# Patient Record
Sex: FEMALE | Race: ASIAN | NOT HISPANIC OR LATINO | Employment: OTHER | URBAN - METROPOLITAN AREA
[De-identification: names, ages, dates, MRNs, and addresses within clinical notes are randomized per-mention and may not be internally consistent; named-entity substitution may affect disease eponyms.]

---

## 2017-05-16 ENCOUNTER — OFFICE VISIT (OUTPATIENT)
Dept: INTERNAL MEDICINE | Facility: CLINIC | Age: 62
End: 2017-05-16
Payer: MEDICAID

## 2017-05-16 VITALS
HEART RATE: 81 BPM | DIASTOLIC BLOOD PRESSURE: 80 MMHG | BODY MASS INDEX: 26.31 KG/M2 | TEMPERATURE: 97.5 F | HEIGHT: 64 IN | SYSTOLIC BLOOD PRESSURE: 130 MMHG | OXYGEN SATURATION: 100 % | WEIGHT: 154.1 LBS

## 2017-05-16 DIAGNOSIS — Z11.59 NEED FOR HEPATITIS B SCREENING TEST: ICD-10-CM

## 2017-05-16 DIAGNOSIS — Z11.59 NEED FOR HEPATITIS C SCREENING TEST: ICD-10-CM

## 2017-05-16 DIAGNOSIS — E05.90 HYPERTHYROIDISM: ICD-10-CM

## 2017-05-16 DIAGNOSIS — Z23 NEED FOR TDAP VACCINATION: ICD-10-CM

## 2017-05-16 DIAGNOSIS — Z00.00 ROUTINE GENERAL MEDICAL EXAMINATION AT A HEALTH CARE FACILITY: Primary | ICD-10-CM

## 2017-05-16 LAB
ALBUMIN SERPL-MCNC: 3.9 G/DL (ref 3.4–5)
ALP SERPL-CCNC: 107 U/L (ref 40–150)
ALT SERPL W P-5'-P-CCNC: 45 U/L (ref 0–50)
ANION GAP SERPL CALCULATED.3IONS-SCNC: 6 MMOL/L (ref 3–14)
AST SERPL W P-5'-P-CCNC: 34 U/L (ref 0–45)
BASOPHILS # BLD AUTO: 0 10E9/L (ref 0–0.2)
BASOPHILS NFR BLD AUTO: 0.2 %
BILIRUB SERPL-MCNC: 1.2 MG/DL (ref 0.2–1.3)
BUN SERPL-MCNC: 11 MG/DL (ref 7–30)
CALCIUM SERPL-MCNC: 9.1 MG/DL (ref 8.5–10.1)
CHLORIDE SERPL-SCNC: 106 MMOL/L (ref 94–109)
CHOLEST SERPL-MCNC: 203 MG/DL
CO2 SERPL-SCNC: 29 MMOL/L (ref 20–32)
CREAT SERPL-MCNC: 0.76 MG/DL (ref 0.52–1.04)
DIFFERENTIAL METHOD BLD: NORMAL
EOSINOPHIL # BLD AUTO: 0.1 10E9/L (ref 0–0.7)
EOSINOPHIL NFR BLD AUTO: 2.7 %
ERYTHROCYTE [DISTWIDTH] IN BLOOD BY AUTOMATED COUNT: 12.4 % (ref 10–15)
GFR SERPL CREATININE-BSD FRML MDRD: 78 ML/MIN/1.7M2
GLUCOSE SERPL-MCNC: 93 MG/DL (ref 70–99)
HCT VFR BLD AUTO: 43.4 % (ref 35–47)
HDLC SERPL-MCNC: 42 MG/DL
HGB BLD-MCNC: 14.1 G/DL (ref 11.7–15.7)
LDLC SERPL CALC-MCNC: 128 MG/DL
LYMPHOCYTES # BLD AUTO: 2.2 10E9/L (ref 0.8–5.3)
LYMPHOCYTES NFR BLD AUTO: 45.9 %
MCH RBC QN AUTO: 29.6 PG (ref 26.5–33)
MCHC RBC AUTO-ENTMCNC: 32.5 G/DL (ref 31.5–36.5)
MCV RBC AUTO: 91 FL (ref 78–100)
MONOCYTES # BLD AUTO: 0.3 10E9/L (ref 0–1.3)
MONOCYTES NFR BLD AUTO: 6.1 %
NEUTROPHILS # BLD AUTO: 2.1 10E9/L (ref 1.6–8.3)
NEUTROPHILS NFR BLD AUTO: 45.1 %
NONHDLC SERPL-MCNC: 161 MG/DL
PLATELET # BLD AUTO: 189 10E9/L (ref 150–450)
POTASSIUM SERPL-SCNC: 4.2 MMOL/L (ref 3.4–5.3)
PROT SERPL-MCNC: 8.6 G/DL (ref 6.8–8.8)
RBC # BLD AUTO: 4.76 10E12/L (ref 3.8–5.2)
SODIUM SERPL-SCNC: 141 MMOL/L (ref 133–144)
TRIGL SERPL-MCNC: 164 MG/DL
WBC # BLD AUTO: 4.8 10E9/L (ref 4–11)

## 2017-05-16 PROCEDURE — 80053 COMPREHEN METABOLIC PANEL: CPT | Performed by: FAMILY MEDICINE

## 2017-05-16 PROCEDURE — 80061 LIPID PANEL: CPT | Performed by: FAMILY MEDICINE

## 2017-05-16 PROCEDURE — 86803 HEPATITIS C AB TEST: CPT | Performed by: FAMILY MEDICINE

## 2017-05-16 PROCEDURE — 93000 ELECTROCARDIOGRAM COMPLETE: CPT | Performed by: FAMILY MEDICINE

## 2017-05-16 PROCEDURE — 85025 COMPLETE CBC W/AUTO DIFF WBC: CPT | Performed by: FAMILY MEDICINE

## 2017-05-16 PROCEDURE — 90715 TDAP VACCINE 7 YRS/> IM: CPT | Performed by: FAMILY MEDICINE

## 2017-05-16 PROCEDURE — 90471 IMMUNIZATION ADMIN: CPT | Performed by: FAMILY MEDICINE

## 2017-05-16 PROCEDURE — 87341 HEP B SURFACE AG NEUTRLZJ IA: CPT | Performed by: FAMILY MEDICINE

## 2017-05-16 PROCEDURE — 99396 PREV VISIT EST AGE 40-64: CPT | Mod: 25 | Performed by: FAMILY MEDICINE

## 2017-05-16 PROCEDURE — 87340 HEPATITIS B SURFACE AG IA: CPT | Performed by: FAMILY MEDICINE

## 2017-05-16 PROCEDURE — 36415 COLL VENOUS BLD VENIPUNCTURE: CPT | Performed by: FAMILY MEDICINE

## 2017-05-16 NOTE — MR AVS SNAPSHOT
After Visit Summary   5/16/2017    Mulu Knowles    MRN: [id number]           Patient Information     Date Of Birth          1955        Visit Information        Provider Department      5/16/2017 8:30 AM Open, Assignments; Wai Sotelo MD [facility]        Today's Diagnoses     Routine general medical examination at a health care facility    -  1    Need for hepatitis C screening test        Need for hepatitis B screening test        Hyperthyroidism          Care Instructions      Preventive Health Recommendations  Female Ages 50 - 64    Yearly exam: See your health care provider every year in order to  o Review health changes.   o Discuss preventive care.    o Review your medicines if your doctor has prescribed any.      Get a Pap test every three years (unless you have an abnormal result and your provider advises testing more often).    If you get Pap tests with HPV test, you only need to test every 5 years, unless you have an abnormal result.     You do not need a Pap test if your uterus was removed (hysterectomy) and you have not had cancer.    You should be tested each year for STDs (sexually transmitted diseases) if you're at risk.     Have a mammogram every 1 to 2 years.    Have a colonoscopy at age 50, or have a yearly FIT test (stool test). These exams screen for colon cancer.      Have a cholesterol test every 5 years, or more often if advised.    Have a diabetes test (fasting glucose) every three years. If you are at risk for diabetes, you should have this test more often.     If you are at risk for osteoporosis (brittle bone disease), think about having a bone density scan (DEXA).    Shots: Get a flu shot each year. Get a tetanus shot every 10 years.    Nutrition:     Eat at least 5 servings of fruits and vegetables each day.    Eat whole-grain bread, whole-wheat pasta and brown rice instead of white grains and rice.    Talk to your provider about Calcium and  "Vitamin D.     Lifestyle    Exercise at least 150 minutes a week (30 minutes a day, 5 days a week). This will help you control your weight and prevent disease.    Limit alcohol to one drink per day.    No smoking.     Wear sunscreen to prevent skin cancer.     See your dentist every six months for an exam and cleaning.    See your eye doctor every 1 to 2 years.          Follow-ups after your visit        Future tests that were ordered for you today     Open Future Orders        Priority Expected Expires Ordered    EKG 12-lead complete with read (future) Routine  5/16/2018 5/16/2017            Who to contact     If you have questions or need follow up information about today's clinic visit or your schedule please contact [facility] directly at [phone].  Normal or non-critical lab and imaging results will be communicated to you by Trademarkiahart, letter or phone within 4 business days after the clinic has received the results. If you do not hear from us within 7 days, please contact the clinic through Trademarkiahart or phone. If you have a critical or abnormal lab result, we will notify you by phone as soon as possible.  Submit refill requests through Nodality or call your pharmacy and they will forward the refill request to us. Please allow 3 business days for your refill to be completed.          Additional Information About Your Visit        Nodality Information     Nodality lets you send messages to your doctor, view your test results, renew your prescriptions, schedule appointments and more. To sign up, go to [email or web address] . Click on \"Log in\" on the left side of the screen, which will take you to the Welcome page. Then click on \"Sign up Now\" on the right side of the page.     You will be asked to enter the access code listed below, as well as some personal information. Please follow the directions to create your username and password.     Your access code is: [email or web address]  Expires: 8/14/2017  9:33 " "AM     Your access code will  in 90 days. If you need help or a new code, please call your [facility] or [phone].        Care EveryWhere ID     This is your Care EveryWhere ID. This could be used by other organizations to access your [facility] medical records  [id number]        Your Vitals Were     Pulse Temperature Height Pulse Oximetry BMI (Body Mass Index)       81 97.5  F (36.4  C) (Oral) 5' 3.75\" (1.619 m) 100% 26.66 kg/m2        Blood Pressure from Last 3 Encounters:   17 130/80   09/15/15 137/89   09/11/15 130/80    Weight from Last 3 Encounters:   17 154 lb 1.6 oz (69.9 kg)   09/15/15 149 lb (67.6 kg)   09/11/15 148 lb 4.8 oz (67.3 kg)              We Performed the Following     CBC with platelets and differential     Comprehensive metabolic panel (BMP + Alb, Alk Phos, ALT, AST, Total. Bili, TP)     Hepatitis B surface antigen     Hepatitis C antibody     Lipid Profile (Chol, Trig, HDL, LDL calc)        Primary Care Provider Office Phone # Fax #    Sera Esquivel [phone]       [facility] [address] [place] [address]        Thank you!     Thank you for choosing [facility]  for your care. Our goal is always to provide you with excellent care. Hearing back from our patients is one way we can continue to improve our services. Please take a few minutes to complete the written survey that you may receive in the mail after your visit with us. Thank you!             Your Updated Medication List - Protect others around you: Learn how to safely use, store and throw away your medicines at www.disposemymeds.org.          This list is accurate as of: 17  9:33 AM.  Always use your most recent med list.                   Brand Name Dispense Instructions for use    propylthiouracil 50 MG tablet    PTU     Take 50 mg by mouth daily Reported on 2017         "

## 2017-05-16 NOTE — PROGRESS NOTES
SUBJECTIVE:     CC: Mulu Knowles is an 61 year old woman who presents for preventive health visit.     Healthy Habits:    Do you get at least three servings of calcium containing foods daily (dairy, green leafy vegetables, etc.)? No    Amount of exercise or daily activities, outside of work: No.    Problems taking medications regularly No    Medication side effects: No    Have you had an eye exam in the past two years? no    Do you see a dentist twice per year? no    Do you have sleep apnea, excessive snoring or daytime drowsiness?no      She is here with  who does all of the talking. She apparently doesn't speak much English. Originally from [facility].  They desire lab work and EKG.  She declined pelvic exam and Pap and declined mammogram.      Patient Active Problem List   Diagnosis     CARDIOVASCULAR SCREENING; LDL GOAL LESS THAN 160     Hyperthyroidism     Current Outpatient Prescriptions   Medication Sig Dispense Refill     propylthiouracil (PTU) 50 MG tablet Take 50 mg by mouth daily Reported on 5/16/2017             Today's PHQ-2 Score:   PHQ-2 ( 1999 Pfizer) 9/11/2015 3/20/2014   Q1: Little interest or pleasure in doing things 0 0   Q2: Feeling down, depressed or hopeless 0 0   PHQ-2 Score 0 0       Abuse: Current or Past(Physical, Sexual or Emotional)- No  Do you feel safe in your environment - Yes    Social History   Substance Use Topics     Smoking status: Never Smoker     Smokeless tobacco: Never Used     Alcohol use No     The patient does not drink >3 drinks per day nor >7 drinks per week.    Recent Labs   Lab Test  05/14/12   1059   CHOL  187   HDL  41*   LDL  118   TRIG  138   CHOLHDLRATIO  4.6       Reviewed orders with patient.  Reviewed health maintenance and updated orders accordingly - No    Mammo Decision Support:  Mammo discussed, not appropriate for or declined by this patient.    Pertinent mammograms are reviewed under the imaging tab.  History of abnormal Pap smear: NO - age 30- 65  "PAP every 3 years recommended      Reviewed and updated as needed this visit by clinical staff         Reviewed and updated as needed this visit by Provider            ROS:  C: NEGATIVE for fever, chills, change in weight  I: NEGATIVE for worrisome rashes, moles or lesions  E: NEGATIVE for vision changes or irritation  ENT: NEGATIVE for ear, mouth and throat problems  R: NEGATIVE for significant cough or SOB  B: NEGATIVE for masses, tenderness or discharge  CV: NEGATIVE for chest pain, palpitations or peripheral edema  GI: NEGATIVE for nausea, abdominal pain, heartburn, or change in bowel habits  : NEGATIVE for unusual urinary or vaginal symptoms. No vaginal bleeding.  M: NEGATIVE for significant arthralgias or myalgia  N: NEGATIVE for weakness, dizziness or paresthesias  P: NEGATIVE for changes in mood or affect       OBJECTIVE:     /80 (BP Location: Right arm, Patient Position: Chair, Cuff Size: Adult Large)  Pulse 81  Temp 97.5  F (36.4  C) (Oral)  Ht 5' 3.75\" (1.619 m)  Wt 154 lb 1.6 oz (69.9 kg)  SpO2 100%  BMI 26.66 kg/m2  EXAM:  GENERAL: healthy, alert and no distress  EYES: Eyes grossly normal to inspection, PERRL and conjunctivae and sclerae normal  HENT: ear canals and TM's normal, nose and mouth without ulcers or lesions  NECK: no adenopathy, no asymmetry, masses, or scars and thyroid normal to palpation  RESP: lungs clear to auscultation - no rales, rhonchi or wheezes  BREAST: normal without masses, tenderness or nipple discharge and no palpable axillary masses or adenopathy  CV: regular rate and rhythm, normal S1 S2, no S3 or S4, no murmur, click or rub, no peripheral edema   ABDOMEN: soft, nontender, no hepatosplenomegaly, no masses and bowel sounds normal  MS: no gross musculoskeletal defects noted, no edema  SKIN: no suspicious lesions or rashes  NEURO: Normal strength and tone, mentation intact and speech normal  PSYCH: mentation appears normal, affect normal/bright      EKG: RSR, rate " "70, axis normal, St segments and T waves normal, no ectopy, no scar. Tracing WNL.        ASSESSMENT/PLAN:     1. Routine general medical examination at a health care facility  Exam WNL as performed.  - Comprehensive metabolic panel (BMP + Alb, Alk Phos, ALT, AST, Total. Bili, TP)  - Lipid Profile (Chol, Trig, HDL, LDL calc)  - CBC with platelets and differential  - EKG 12-lead complete with read (future); Future  - EKG 12-lead complete w/read - Clinics    2. Need for hepatitis C screening test    - Hepatitis C antibody    3. Need for hepatitis B screening test    - Hepatitis B surface antigen    4. Hyperthyroidism  She sees an Endocrinologist. Declines thyroid functin labs today.    5. Need for Tdap vaccination    - TDAP (ADACEL)    COUNSELING:   Reviewed preventive health counseling, as reflected in patient instructions       Regular exercise       Healthy diet/nutrition         reports that she has never smoked. She has never used smokeless tobacco.    Estimated body mass index is 25.78 kg/(m^2) as calculated from the following:    Height as of 9/15/15: 5' 3.75\" (1.619 m).    Weight as of 9/15/15: 149 lb (67.6 kg).       Counseling Resources:  ATP IV Guidelines  Pooled Cohorts Equation Calculator  Breast Cancer Risk Calculator  FRAX Risk Assessment  ICSI Preventive Guidelines  Dietary Guidelines for Americans, 2010  USDA's MyPlate  ASA Prophylaxis  Lung CA Screening    Wai Sotelo MD  [facility]  "

## 2017-05-16 NOTE — NURSING NOTE
"Chief Complaint   Patient presents with     Physical     She thinks she's allergic to some kind of food because after eating, her lips feels tingling for a long time. She's fasting for blood work.       Initial /80 (BP Location: Right arm, Patient Position: Chair, Cuff Size: Adult Large)  Pulse 81  Temp 97.5  F (36.4  C) (Oral)  Ht 5' 3.75\" (1.619 m)  Wt 154 lb 1.6 oz (69.9 kg)  SpO2 100%  BMI 26.66 kg/m2 Estimated body mass index is 26.66 kg/(m^2) as calculated from the following:    Height as of this encounter: 5' 3.75\" (1.619 m).    Weight as of this encounter: 154 lb 1.6 oz (69.9 kg).  Medication Reconciliation: complete   Claudine Batista MA      "

## 2017-05-17 LAB
HBV SURFACE AG SERPL QL IA: REACTIVE
HCV AB SERPL QL IA: NORMAL

## 2017-06-24 ENCOUNTER — HEALTH MAINTENANCE LETTER (OUTPATIENT)
Age: 62
End: 2017-06-24

## 2018-04-09 ENCOUNTER — OFFICE VISIT (OUTPATIENT)
Dept: INTERNAL MEDICINE | Facility: CLINIC | Age: 63
End: 2018-04-09
Payer: COMMERCIAL

## 2018-04-09 VITALS
HEIGHT: 63 IN | RESPIRATION RATE: 20 BRPM | SYSTOLIC BLOOD PRESSURE: 158 MMHG | BODY MASS INDEX: 28.42 KG/M2 | TEMPERATURE: 97.8 F | HEART RATE: 92 BPM | WEIGHT: 160.4 LBS | DIASTOLIC BLOOD PRESSURE: 104 MMHG

## 2018-04-09 DIAGNOSIS — Z91.013 SEAFOOD ALLERGY: ICD-10-CM

## 2018-04-09 DIAGNOSIS — I10 BENIGN ESSENTIAL HYPERTENSION: Primary | ICD-10-CM

## 2018-04-09 DIAGNOSIS — E05.90 HYPERTHYROIDISM: ICD-10-CM

## 2018-04-09 DIAGNOSIS — K21.9 GASTROESOPHAGEAL REFLUX DISEASE WITHOUT ESOPHAGITIS: ICD-10-CM

## 2018-04-09 DIAGNOSIS — L50.9 URTICARIA: ICD-10-CM

## 2018-04-09 DIAGNOSIS — T78.3XXD ANGIOEDEMA, SUBSEQUENT ENCOUNTER: ICD-10-CM

## 2018-04-09 LAB
ANION GAP SERPL CALCULATED.3IONS-SCNC: 4 MMOL/L (ref 3–14)
BUN SERPL-MCNC: 12 MG/DL (ref 7–30)
CALCIUM SERPL-MCNC: 8.9 MG/DL (ref 8.5–10.1)
CHLORIDE SERPL-SCNC: 104 MMOL/L (ref 94–109)
CO2 SERPL-SCNC: 30 MMOL/L (ref 20–32)
CREAT SERPL-MCNC: 0.72 MG/DL (ref 0.52–1.04)
GFR SERPL CREATININE-BSD FRML MDRD: 82 ML/MIN/1.7M2
GLUCOSE SERPL-MCNC: 86 MG/DL (ref 70–99)
POTASSIUM SERPL-SCNC: 4.4 MMOL/L (ref 3.4–5.3)
SODIUM SERPL-SCNC: 138 MMOL/L (ref 133–144)
TSH SERPL DL<=0.005 MIU/L-ACNC: 1.77 MU/L (ref 0.4–4)

## 2018-04-09 PROCEDURE — 99214 OFFICE O/P EST MOD 30 MIN: CPT | Performed by: INTERNAL MEDICINE

## 2018-04-09 PROCEDURE — 36415 COLL VENOUS BLD VENIPUNCTURE: CPT | Performed by: INTERNAL MEDICINE

## 2018-04-09 PROCEDURE — T1013 SIGN LANG/ORAL INTERPRETER: HCPCS | Mod: U3 | Performed by: INTERNAL MEDICINE

## 2018-04-09 PROCEDURE — 84443 ASSAY THYROID STIM HORMONE: CPT | Performed by: INTERNAL MEDICINE

## 2018-04-09 PROCEDURE — 80048 BASIC METABOLIC PNL TOTAL CA: CPT | Performed by: INTERNAL MEDICINE

## 2018-04-09 RX ORDER — ATENOLOL 25 MG/1
25 TABLET ORAL DAILY
Qty: 90 TABLET | Refills: 1 | Status: SHIPPED | OUTPATIENT
Start: 2018-04-09 | End: 2018-07-31 | Stop reason: DRUGHIGH

## 2018-04-09 NOTE — PROGRESS NOTES
SUBJECTIVE:   Mulu nKowles is a 62 year old female who presents to clinic today for the following health issues:    Pt states uses atenolol PRN    Mouth/Lip Problem      Duration: 5-6 months    Description (location/character/radiation): swollen and itching on lips    Intensity:  mild    Accompanying signs and symptoms: redness on lips, drys and cracks    History (similar episodes/previous evaluation): None    Precipitating or alleviating factors: worse when she does not put anything on it. Stings when eating salty foods.     Therapies tried and outcome: cold sore ointment and carmex with some relief with dryness. When she does not use ointment it peels           Problem list and histories reviewed & adjusted, as indicated.  Additional history: as documented        Reviewed and updated as needed this visit by clinical staff  Tobacco  Allergies  Meds       Reviewed and updated as needed this visit by Provider  Allergies           Past Medical History:  ---------------------------  Past Medical History:   Diagnosis Date     Allergy      Hyperthyroidism 6/2/12       Past Surgical History:  ---------------------------  Past Surgical History:   Procedure Laterality Date     DISCECTOMY LUMBAR POSTERIOR MICROSCOPIC ONE LEVEL  6/4/07    Left L4-L5 [facility]diske[facility] at [facility]       Current Medications:  ---------------------------  Current Outpatient Prescriptions   Medication Sig Dispense Refill     IBUPROFEN PO        ATENOLOL PO Take 25 mg by mouth daily as needed (uses every 10 days or so)       B Complex Vitamins (B COMPLEX PO)          Allergies:  -------------  Allergies   Allergen Reactions     Bactrim [Sulfamethoxazole W-Trimethoprim] Hives     hives     Gaviscon [Foaming Antacid]      hives     Seafood      Advil [Ibuprofen] Itching and Rash     Ampicillin Itching and Rash     Penicillins Itching and Rash     Tylenol [Acetaminophen] Itching and Rash       Social History:  -------------------  Social History  "    Social History     Marital status:      Spouse name: N/A     Number of children: N/A     Years of education: N/A     Occupational History     Not on file.     Social History Main Topics     Smoking status: Never Smoker     Smokeless tobacco: Never Used     Alcohol use No     Drug use: No     Sexual activity: Yes     Partners: Male     Other Topics Concern     Not on file     Social History Narrative       Family Medical History:  ------------------------------  Family History   Problem Relation Age of Onset     Unknown/Adopted Mother      Unknown/Adopted Father      Unknown/Adopted Maternal Grandmother      Unknown/Adopted Maternal Grandfather      Unknown/Adopted Paternal Grandmother      Unknown/Adopted Paternal Grandfather          ROS:  REVIEW OF SYSTEMS:    RESP: negative for cough, dyspnea, wheezing, hemoptysis  CV: negative for chest pain, palpitations, PND, BROOKS, orthopnea; reports no changes in their ability to perform physical activity (from cardiovascular standpoint)  GI: negative for dysphagia, N/V, pain, melena, diarrhea and constipation  NEURO: negative for numbness/tingling, paralysis, incoordination, or focal weakness     OBJECTIVE:                                                    BP (!) 158/104  Pulse 92  Temp 97.8  F (36.6  C) (Oral)  Resp 20  Ht 5' 2.75\" (1.594 m)  Wt 160 lb 6.4 oz (72.8 kg)  BMI 28.64 kg/m2     GENERAL alert and no distress  EYES:  Normal sclera,conjunctiva, EOMI  HENT: oral and posterior pharynx without lesions or erythema, facies symmetric  NECK: Neck supple. No LAD, without thyroidmegaly or JVD., Carotids without bruits.  RESP: Clear to ausculation bilaterally without wheezes or crackles. Normal BS in all fields.  CV: RRR normal S1S2 without murmurs, rubs or gallops. PMI normal  LYMPH: no cervical lymph adenopathy appreciated  MS: extremities- no gross deformities of the visible extremities noted, no edema  PSYCH: Alert and oriented times 3; speech- " coherent  SKIN:  No obvious significant skin lesions on visible portions of face          ASSESSMENT/PLAN:                                                      (I10) Benign essential hypertension  (primary encounter diagnosis)  Comment: resume meds., be sure to take them every day  Discussed current hypertension treatment guidelines, including indications for treatment and treatment options.  Discussed the importance for aggressive management of HTN to prevent vascular complications later.  Recommended lower fat, lower carbohydrate, and lower sodium (<2000 mg)diet.  Discussed required intervals for follow up on HTN, lab studies.  Recommened pt. follow their blood pressures outside the clinic to ensure that BPs are remaining within guidelines, and to contact me if the readings are not within guidelines on a regular basis so we can adjust treatment as needed.   Plan: atenolol (TENORMIN) 25 MG tablet, Basic         metabolic panel            (E05.90) Hyperthyroidism  Comment: recheck labs.   Plan: TSH with free T4 reflex            (Z91.013) Seafood allergy  Comment: allergy referral for further testing to further deliniate the extent of her allergies.   Plan: ALLERGY/ASTHMA ADULT REFERRAL            (T78.3XXD) Angioedema, subsequent encounter  Comment:   Plan: ALLERGY/ASTHMA ADULT REFERRAL            (L50.9) Urticaria  Comment:   Plan: ALLERGY/ASTHMA ADULT REFERRAL, ranitidine         (ZANTAC) 150 MG tablet            (K21.9) Gastroesophageal reflux disease without esophagitis  Comment: This condition is currently controlled on the current medical regimen.  Continue current therapy.   Plan: ranitidine (ZANTAC) 150 MG tablet               *  Checking the thyroid labs to make sure that your thyroid levels are still normal.     *  If any abnormalities, we will then send you to the Endocrinologists.     *  Your blood pressure is higher than desired.      --Resume Atenolol 25 mg once per day EVERY DAY.     *  Take the  "blood pressure medication ( Atenolol)  EVERY DAY.     *  Urticaria or \"hives\" is a systemic allergic reaction that needs to be \"treated from the inside\".  Avoid any known triggers (if you can find out what they might have been.  Sometime you never find out what triggered the reaction.      *  Take over the counter allergy tablet every day for the next 4-6 weeks.      Take one of the following over the counter medications:  --generic Allegra (fexofenadine),   --generic Zyrtec (Cetirizine),   --generic Claritin (Loratadine)     *  Use over the counter Benadryl 25 or 50 mg twice per day as needed for itching relief,perhaps more in the evening or bedtime.  Beware of drowsiness after taking this medication, do not drive, use dangerous machinery, or perform dangerous tasks after taking this.      *  Referral to allergist to better understand the extent of your allergies:     --[facility] [place] [phone]   [email or web address]     --[facility] [phone]   [email or web address]     --[facility]: [place] [facility] [phone]   [email or web address]     --[facility] [place] [phone]   [email or web address]    *  Take Ranitidine (generic Zantac) 150 mg twice per day to help reduce heartburn, and also help with the allergy symptoms.     *  Return to see Dr. Esquivel at the [facility] or myself in 2-3 months.  You can also follow up at the [facility] in [place] if that is closer to your home.               See Patient Instructions    ALLI LANDAVERDE M.D., MD  [facility]   "

## 2018-04-09 NOTE — LETTER
[facility]  [address]  [address]  [phone]        Mulu Knowles  [address]  [facility] [address]      4/10/2018        Dear Ms. Mulu Knowles:    I am writing to inform you of the results of the laboratory tests you had done recently.    Kidney  function: NORMAL  Thyroid function: NORMAL  Electrolytes: NORMAL  Glucose: NORMAL    Your labs are all within normal limits., including the thyroid labs.     Thank you for allowing me to participate in your care. If you have any further questions or problems, please contact me via our nurse line at [phone].    Sincerely,          Bay Cyr MD  Department of Internal Medicine  [facility]

## 2018-04-09 NOTE — PATIENT INSTRUCTIONS
"*  Checking the thyroid labs to make sure that your thyroid levels are still normal.     *  If any abnormalities, we will then send you to the Endocrinologists.     *  Your blood pressure is higher than desired.      --Resume Atenolol 25 mg once per day EVERY DAY.     *  Take the blood pressure medication ( Atenolol)  EVERY DAY.     *  Urticaria or \"hives\" is a systemic allergic reaction that needs to be \"treated from the inside\".  Avoid any known triggers (if you can find out what they might have been.  Sometime you never find out what triggered the reaction.      *  Take over the counter allergy tablet every day for the next 4-6 weeks.      Take one of the following over the counter medications:  --generic Allegra (fexofenadine),   --generic Zyrtec (Cetirizine),   --generic Claritin (Loratadine)     *  Use over the counter Benadryl 25 or 50 mg twice per day as needed for itching relief,perhaps more in the evening or bedtime.  Beware of drowsiness after taking this medication, do not drive, use dangerous machinery, or perform dangerous tasks after taking this.      *  Referral to allergist to better understand the extent of your allergies:     --[facility] [place] [phone]   [email or web address]     --[facility] [phone]   [email or web address]     --[facility]: [place] [facility] [phone]   [email or web address]     --[facility] [place] [phone]   [email or web address]    *  Take Ranitidine (generic Zantac) 150 mg twice per day to help reduce heartburn, and also help with the allergy symptoms.     *  Return to see Dr. Esquivel at the [facility] or myself in 2-3 months.  You can also follow up at the [facility] in [place] if that is closer to your home.           "

## 2018-04-09 NOTE — MR AVS SNAPSHOT
"              After Visit Summary   4/9/2018    Mulu Knowles    MRN: [id number]           Patient Information     Date Of Birth          1955        Visit Information        Provider Department      4/9/2018 3:30 PM Aura Disla Christofer Adams, MD [facility]        Today's Diagnoses     Benign essential hypertension    -  1    Hyperthyroidism        Seafood allergy        Angioedema, subsequent encounter        Urticaria        Gastroesophageal reflux disease without esophagitis          Care Instructions    *  Checking the thyroid labs to make sure that your thyroid levels are still normal.     *  If any abnormalities, we will then send you to the Endocrinologists.     *  Your blood pressure is higher than desired.      --Resume Atenolol 25 mg once per day EVERY DAY.     *  Take the blood pressure medication ( Atenolol)  EVERY DAY.     *  Urticaria or \"hives\" is a systemic allergic reaction that needs to be \"treated from the inside\".  Avoid any known triggers (if you can find out what they might have been.  Sometime you never find out what triggered the reaction.      *  Take over the counter allergy tablet every day for the next 4-6 weeks.      Take one of the following over the counter medications:  --generic Allegra (fexofenadine),   --generic Zyrtec (Cetirizine),   --generic Claritin (Loratadine)     *  Use over the counter Benadryl 25 or 50 mg twice per day as needed for itching relief,perhaps more in the evening or bedtime.  Beware of drowsiness after taking this medication, do not drive, use dangerous machinery, or perform dangerous tasks after taking this.      *  Referral to allergist to better understand the extent of your allergies:     --[facility] [place] [phone]   [email or web address]     --[facility] [place] [phone]   [email or web address]     --[facility]: [place] " [facility] [phone]   [email or web address]     --[facility] [phone]   [email or web address]    *  Take Ranitidine (generic Zantac) 150 mg twice per day to help reduce heartburn, and also help with the allergy symptoms.     *  Return to see Dr. Esquivel at the [facility] or myself in 2-3 months.  You can also follow up at the [facility] in [place] if that is closer to your home.                   Follow-ups after your visit        Additional Services     ALLERGY/ASTHMA ADULT REFERRAL       Your provider has referred you to:     [facility] [place] [phone]   [email or web address]    [facility] [facility] [phone]   [email or web address]    [facility] [place] [facility] [phone]   [email or web address]    [facility] [facility] [phone]   [email or web address]    Please be aware that coverage of these services is subject to the terms and limitations of your health insurance plan.  Call member services at your health plan with any benefit or coverage questions.      Please bring the following with you to your appointment:    (1) Any X-Rays, CTs or MRIs which have been performed.  Contact the facility where they were done to arrange for  prior to your scheduled appointment.    (2) List of current medications  (3) This referral request   (4) Any documents/labs given to you for this referral                  Who to contact     If you have questions or need follow up information about today's clinic visit or your schedule please contact [facility] directly at [phone].  Normal or non-critical lab and imaging results will be communicated to you by MyChart, letter or phone within 4 business days after the clinic has received the results. If  "you do not hear from us within 7 days, please contact the clinic through Yvolver or phone. If you have a critical or abnormal lab result, we will notify you by phone as soon as possible.  Submit refill requests through Yvolver or call your pharmacy and they will forward the refill request to us. Please allow 3 business days for your refill to be completed.          Additional Information About Your Visit        AvitideharLowfoot Information     Yvolver lets you send messages to your doctor, view your test results, renew your prescriptions, schedule appointments and more. To sign up, go to [email or web address] . Click on \"Log in\" on the left side of the screen, which will take you to the Welcome page. Then click on \"Sign up Now\" on the right side of the page.     You will be asked to enter the access code listed below, as well as some personal information. Please follow the directions to create your username and password.     Your access code is: [email or web address]  Expires: 2018  4:55 PM     Your access code will  in 90 days. If you need help or a new code, please call your [place] clinic or [phone].        Care EveryWhere ID     This is your Care EveryWhere ID. This could be used by other organizations to access your [place] medical records  [id number]        Your Vitals Were     Pulse Temperature Respirations Height BMI (Body Mass Index)       92 97.8  F (36.6  C) (Oral) 20 5' 2.75\" (1.594 m) 28.64 kg/m2        Blood Pressure from Last 3 Encounters:   18 (!) 158/104   17 130/80   09/15/15 137/89    Weight from Last 3 Encounters:   18 160 lb 6.4 oz (72.8 kg)   17 154 lb 1.6 oz (69.9 kg)   09/15/15 149 lb (67.6 kg)              We Performed the Following     ALLERGY/ASTHMA ADULT REFERRAL     Basic metabolic panel     TSH with free T4 reflex          Today's Medication Changes          These changes are accurate as of 18  4:55 PM.  If you have any questions, ask your nurse or doctor. "               Start taking these medicines.        Dose/Directions    ranitidine 150 MG tablet   Commonly known as:  ZANTAC   Used for:  Gastroesophageal reflux disease without esophagitis, Urticaria   Started by:  Bay Cyr MD        Dose:  150 mg   Take 1 tablet (150 mg) by mouth 2 times daily   Quantity:  60 tablet   Refills:  11         These medicines have changed or have updated prescriptions.        Dose/Directions    atenolol 25 MG tablet   Commonly known as:  TENORMIN   This may have changed:    - when to take this  - reasons to take this   Used for:  Benign essential hypertension   Changed by:  Bay Cyr MD        Dose:  25 mg   Take 1 tablet (25 mg) by mouth daily   Quantity:  90 tablet   Refills:  1         Stop taking these medicines if you haven't already. Please contact your care team if you have questions.     IBUPROFEN PO   Stopped by:  Bay Cyr MD                Where to get your medicines      These medications were sent to [address] [address]     Phone:  [phone]     atenolol 25 MG tablet    ranitidine 150 MG tablet                Primary Care Provider Office Phone # Fax #    Sera Esquivel [phone]       [address] [address]        Equal Access to Services     TRISTAN GLOVER AH: Hadii gil ku hadasho Soomaali, waaxda luqadaha, qaybta kaalmada adeegyada, waxay marioin hayzachn nabila santos. So [facility] [phone].    ATENCIÓN: Si habla español, tiene a dorsey disposición servicios gratuitos de asistencia lingüística. Llame al [phone].    We comply with applicable federal civil rights laws and [place] laws. We do not discriminate on the basis of race, color, national origin, age, disability, sex, sexual orientation, or gender identity.            Thank you!     Thank you for choosing [facility]   for your care. Our goal is always to provide you with excellent care. Hearing back from our patients is one way we can continue to improve our services. Please take a few minutes to complete the written survey that you may receive in the mail after your visit with us. Thank you!             Your Updated Medication List - Protect others around you: Learn how to safely use, store and throw away your medicines at www.disposemymeds.org.          This list is accurate as of 4/9/18  4:55 PM.  Always use your most recent med list.                   Brand Name Dispense Instructions for use Diagnosis    atenolol 25 MG tablet    TENORMIN    90 tablet    Take 1 tablet (25 mg) by mouth daily    Benign essential hypertension       B COMPLEX PO           ranitidine 150 MG tablet    ZANTAC    60 tablet    Take 1 tablet (150 mg) by mouth 2 times daily    Gastroesophageal reflux disease without esophagitis, Urticaria

## 2018-04-25 ENCOUNTER — TRANSFERRED RECORDS (OUTPATIENT)
Dept: HEALTH INFORMATION MANAGEMENT | Facility: CLINIC | Age: 63
End: 2018-04-25

## 2018-05-22 ENCOUNTER — OFFICE VISIT (OUTPATIENT)
Dept: INTERNAL MEDICINE | Facility: CLINIC | Age: 63
End: 2018-05-22
Payer: COMMERCIAL

## 2018-05-22 VITALS
OXYGEN SATURATION: 98 % | HEART RATE: 91 BPM | BODY MASS INDEX: 27.69 KG/M2 | SYSTOLIC BLOOD PRESSURE: 110 MMHG | RESPIRATION RATE: 13 BRPM | DIASTOLIC BLOOD PRESSURE: 66 MMHG | WEIGHT: 155.1 LBS | TEMPERATURE: 97.7 F

## 2018-05-22 DIAGNOSIS — R30.0 DYSURIA: ICD-10-CM

## 2018-05-22 DIAGNOSIS — N30.00 ACUTE CYSTITIS WITHOUT HEMATURIA: Primary | ICD-10-CM

## 2018-05-22 LAB
ALBUMIN UR-MCNC: NEGATIVE MG/DL
APPEARANCE UR: CLEAR
BILIRUB UR QL STRIP: NEGATIVE
COLOR UR AUTO: YELLOW
GLUCOSE UR STRIP-MCNC: NEGATIVE MG/DL
HGB UR QL STRIP: ABNORMAL
KETONES UR STRIP-MCNC: NEGATIVE MG/DL
LEUKOCYTE ESTERASE UR QL STRIP: NEGATIVE
NITRATE UR QL: NEGATIVE
PH UR STRIP: 5.5 PH (ref 5–7)
RBC #/AREA URNS AUTO: ABNORMAL /HPF
SOURCE: ABNORMAL
SP GR UR STRIP: 1.01 (ref 1–1.03)
UROBILINOGEN UR STRIP-ACNC: 0.2 EU/DL (ref 0.2–1)
WBC #/AREA URNS AUTO: ABNORMAL /HPF

## 2018-05-22 PROCEDURE — 99213 OFFICE O/P EST LOW 20 MIN: CPT | Performed by: INTERNAL MEDICINE

## 2018-05-22 PROCEDURE — 81001 URINALYSIS AUTO W/SCOPE: CPT | Performed by: INTERNAL MEDICINE

## 2018-05-22 PROCEDURE — T1013 SIGN LANG/ORAL INTERPRETER: HCPCS | Mod: U3 | Performed by: INTERNAL MEDICINE

## 2018-05-22 RX ORDER — NITROFURANTOIN 25; 75 MG/1; MG/1
100 CAPSULE ORAL 2 TIMES DAILY
Qty: 6 CAPSULE | Refills: 0 | Status: SHIPPED | OUTPATIENT
Start: 2018-05-22 | End: 2019-02-01

## 2018-05-22 NOTE — PROGRESS NOTES
"  SUBJECTIVE:   Mulu Knowles is a 62 year old female who presents to clinic today for the following health issues:    URINARY TRACT SYMPTOMS      Duration: 2 days    Description  dysuria, frequency and hematuria    Intensity:  severe    Accompanying signs and symptoms:  Fever/chills: YES  Flank pain YES  Nausea and vomiting: no   Vaginal symptoms: none  Abdominal/Pelvic Pain: no     History  History of frequent UTI's: YES  History of kidney stones: no   Sexually Active: no   Possibility of pregnancy: No    Precipitating or alleviating factors: None    Therapies tried and outcome: pt takes med from [facility] called\" Furadantine 50mg\" on onset of sx and had relief within one hour    Already started taking  over the counter Nitrofurantoin from [facility], 50 mg tablet, 6 tablets so far since yesterday.           Problem list and histories reviewed & adjusted, as indicated.  Additional history: as documented        Reviewed and updated as needed this visit by clinical staff  Tobacco  Allergies  Meds       Reviewed and updated as needed this visit by Provider           Past Medical History:  ---------------------------  Past Medical History:   Diagnosis Date     Allergy      Hyperthyroidism 6/2/12       Past Surgical History:  ---------------------------  Past Surgical History:   Procedure Laterality Date     DISCECTOMY LUMBAR POSTERIOR MICROSCOPIC ONE LEVEL  6/4/07    Left L4-L5 microdiskectomy at [facility]       Current Medications:  ---------------------------  Current Outpatient Prescriptions   Medication Sig Dispense Refill     atenolol (TENORMIN) 25 MG tablet Take 1 tablet (25 mg) by mouth daily 90 tablet 1     B Complex Vitamins (B COMPLEX PO)        ranitidine (ZANTAC) 150 MG tablet Take 1 tablet (150 mg) by mouth 2 times daily 60 tablet 11       Allergies:  -------------  Allergies   Allergen Reactions     Bactrim [Sulfamethoxazole W-Trimethoprim] Hives     hives     Gaviscon [Foaming Antacid]      hives     Rice  "     Seafood      Advil [Ibuprofen] Itching and Rash     Ampicillin Itching and Rash     Penicillins Itching and Rash     Tylenol [Acetaminophen] Itching and Rash       Social History:  -------------------  Social History     Social History     Marital status:      Spouse name: N/A     Number of children: N/A     Years of education: N/A     Occupational History     Not on file.     Social History Main Topics     Smoking status: Never Smoker     Smokeless tobacco: Never Used     Alcohol use No     Drug use: No     Sexual activity: Yes     Partners: Male     Other Topics Concern     Not on file     Social History Narrative       Family Medical History:  ------------------------------  Family History   Problem Relation Age of Onset     Unknown/Adopted Mother      Unknown/Adopted Father      Unknown/Adopted Maternal Grandmother      Unknown/Adopted Maternal Grandfather      Unknown/Adopted Paternal Grandmother      Unknown/Adopted Paternal Grandfather          ROS:  REVIEW OF SYSTEMS:    RESP: negative for cough, dyspnea, wheezing, hemoptysis  CV: negative for chest pain, palpitations, PND, BROOKS, orthopnea; reports no changes in their ability to perform physical activity (from cardiovascular standpoint)  GI: negative for dysphagia, N/V, pain, melena, diarrhea and constipation  NEURO: negative for numbness/tingling, paralysis, incoordination, or focal weakness     OBJECTIVE:                                                    /66  Pulse 91  Temp 97.7  F (36.5  C) (Oral)  Resp 13  Wt 155 lb 1.6 oz (70.4 kg)  SpO2 98%  BMI 27.69 kg/m2     Physical exam deferred today.      Results for orders placed or performed in visit on 05/22/18   UA with Microscopic reflex to Culture   Result Value Ref Range    Color Urine Yellow     Appearance Urine Clear     Glucose Urine Negative NEG^Negative mg/dL    Bilirubin Urine Negative NEG^Negative    Ketones Urine Negative NEG^Negative mg/dL    Specific Gravity Urine 1.015  1.003 - 1.035    pH Urine 5.5 5.0 - 7.0 pH    Protein Albumin Urine Negative NEG^Negative mg/dL    Urobilinogen Urine 0.2 0.2 - 1.0 EU/dL    Nitrite Urine Negative NEG^Negative    Blood Urine Trace (A) NEG^Negative    Leukocyte Esterase Urine Negative NEG^Negative    Source Midstream Urine     WBC Urine 0 - 5 OTO5^0 - 5 /HPF    RBC Urine O - 2 OTO2^O - 2 /HPF         ASSESSMENT/PLAN:                                                      (N30.00) Acute cystitis without hematuria  (primary encounter diagnosis)  Comment: probably had UTI, but already has completed one day of nitrofurantoin antibiotics.   Finish course of antibiotics.   Drink enough fluid.      Plan:     (R30.0) Dysuria  Comment:   Plan: UA with Microscopic reflex to Culture,         nitroFURantoin, macrocrystal-monohydrate,         (MACROBID) 100 MG capsule               See Patient Instructions    ALIL LANDAVERDE M.D., MD  [facility]

## 2018-05-22 NOTE — MR AVS SNAPSHOT
"              After Visit Summary   5/22/2018    Mulu Knowles    MRN: [id number]           Patient Information     Date Of Birth          1955        Visit Information        Provider Department      5/22/2018 3:00 PM Aura Disla Christofer Adams, MD [facility]        Today's Diagnoses     Acute cystitis without hematuria    -  1    Dysuria          Care Instructions    *  You probably had a urinary tract infection and started on an appropriate antibiotic that started working.     *  Finish course of oral antibiotic:  Nitrofuratnoin 100 mg twice per day for 3 days.    *  Be sure to drink enough of water throughout the day.      *  Continue all medications at the same doses.  Contact your usual pharmacy if you need refills.               Follow-ups after your visit        Who to contact     If you have questions or need follow up information about today's clinic visit or your schedule please contact [facility] directly at [phone].  Normal or non-critical lab and imaging results will be communicated to you by MyChart, letter or phone within 4 business days after the clinic has received the results. If you do not hear from us within 7 days, please contact the clinic through [email or web address] or phone. If you have a critical or abnormal lab result, we will notify you by phone as soon as possible.  Submit refill requests through Haitaobei or call your pharmacy and they will forward the refill request to us. Please allow 3 business days for your refill to be completed.          Additional Information About Your Visit        [email or web address] Information     Haitaobei lets you send messages to your doctor, view your test results, renew your prescriptions, schedule appointments and more. To sign up, go to [email or web address] . Click on \"Log in\" on the left side of the screen, which will take you to the Welcome page. Then click on \"Sign up Now\" on the right side of the page. "     You will be asked to enter the access code listed below, as well as some personal information. Please follow the directions to create your username and password.     Your access code is: [email or web address]  Expires: 2018  4:55 PM     Your access code will  in 90 days. If you need help or a new code, please call your [place] clinic or [phone].        Care EveryWhere ID     This is your Care EveryWhere ID. This could be used by other organizations to access your [place] medical records  [id number]        Your Vitals Were     Pulse Temperature Respirations Pulse Oximetry BMI (Body Mass Index)       91 97.7  F (36.5  C) (Oral) 13 98% 27.69 kg/m2        Blood Pressure from Last 3 Encounters:   18 110/66   18 (!) 158/104   17 130/80    Weight from Last 3 Encounters:   18 155 lb 1.6 oz (70.4 kg)   18 160 lb 6.4 oz (72.8 kg)   17 154 lb 1.6 oz (69.9 kg)              We Performed the Following     UA with Microscopic reflex to Culture          Today's Medication Changes          These changes are accurate as of 18  4:22 PM.  If you have any questions, ask your nurse or doctor.               Start taking these medicines.        Dose/Directions    nitroFURantoin (macrocrystal-monohydrate) 100 MG capsule   Commonly known as:  MACROBID   Used for:  Dysuria   Started by:  Bay Cyr MD        Dose:  100 mg   Take 1 capsule (100 mg) by mouth 2 times daily for 3 days   Quantity:  6 capsule   Refills:  0            Where to get your medicines      These medications were sent to [facility] [address] [address]     Phone:  [phone]     nitroFURantoin (macrocrystal-monohydrate) 100 MG capsule                Primary Care Provider Office Phone # Fax #    Sera Esquivel [phone]       [address] [facility] [address]        Equal Access to Services     [facility]  GAAR : Hadii aad ku hadabdiel Sohalleali, waaxda luqadaha, qaybta kaalmada adelouisa, nilo nasreen elianenikos dahl joelkelly santos. So [facility] [phone].    ATENCIÓN: Si habla español, tiene a dorsey disposición servicios gratuitos de asistencia lingüística. Llame al [phone].    We comply with applicable federal civil rights laws and [place] laws. We do not discriminate on the basis of race, color, national origin, age, disability, sex, sexual orientation, or gender identity.            Thank you!     Thank you for choosing [facility]  for your care. Our goal is always to provide you with excellent care. Hearing back from our patients is one way we can continue to improve our services. Please take a few minutes to complete the written survey that you may receive in the mail after your visit with us. Thank you!             Your Updated Medication List - Protect others around you: Learn how to safely use, store and throw away your medicines at www.disposemymeds.org.          This list is accurate as of 5/22/18  4:22 PM.  Always use your most recent med list.                   Brand Name Dispense Instructions for use Diagnosis    atenolol 25 MG tablet    TENORMIN    90 tablet    Take 1 tablet (25 mg) by mouth daily    Benign essential hypertension       B COMPLEX PO           nitroFURantoin (macrocrystal-monohydrate) 100 MG capsule    MACROBID    6 capsule    Take 1 capsule (100 mg) by mouth 2 times daily for 3 days    Dysuria       ranitidine 150 MG tablet    ZANTAC    60 tablet    Take 1 tablet (150 mg) by mouth 2 times daily    Gastroesophageal reflux disease without esophagitis, Urticaria

## 2018-05-22 NOTE — PATIENT INSTRUCTIONS
*  You probably had a urinary tract infection and started on an appropriate antibiotic that started working.     *  Finish course of oral antibiotic:  Nitrofuratnoin 100 mg twice per day for 3 days.    *  Be sure to drink enough of water throughout the day.      *  Continue all medications at the same doses.  Contact your usual pharmacy if you need refills.

## 2018-07-31 ENCOUNTER — TELEPHONE (OUTPATIENT)
Dept: INTERNAL MEDICINE | Facility: CLINIC | Age: 63
End: 2018-07-31

## 2018-07-31 DIAGNOSIS — I10 BENIGN ESSENTIAL HYPERTENSION: ICD-10-CM

## 2018-07-31 RX ORDER — ATENOLOL 50 MG/1
50 TABLET ORAL DAILY
Qty: 90 TABLET | Refills: 1 | Status: SHIPPED | OUTPATIENT
Start: 2018-07-31 | End: 2019-02-01

## 2018-07-31 NOTE — TELEPHONE ENCOUNTER
notified, will let wife know that Rx is at [facility] and to F/U in 3 Mo. Writer contacted [facility] as PCP sent to different pharmacy.   Ruma Ewdard RN

## 2018-07-31 NOTE — TELEPHONE ENCOUNTER
If the blood pressure was not well controlled on the Atenolol 25 mg dose, then OK to increaes to 50 mg once per day  New RX sent for the Atenolol 50 mg tablets to the [facility], it is closer to their home in [place].  Return to see me in 3 months for routine follow up.  Call [phone] to schedule this appointment.     Also, she should consider seeing a provider closer to her home in [place] if that would be easier, either at the [facility] or [facility] location.  otherwise come and see us.

## 2018-07-31 NOTE — TELEPHONE ENCOUNTER
called for pt stating that her BP has been high. Took it upon themselves to start taking 2 tablets of atenolol (for 1 Mo) as her BP was in the 150/100 160/100, flush face, dizziness, c/o HA. Spoke to  regarding why he did not call sooner about increasing Rx or schedule appt w/PCP as he may want to change Rx? Pt is out of medication, wondering what PCP would like to do. Taking Atenolol 50 mg every day and BP is WNL. Please advise.   Ruma dEward RN

## 2019-01-09 ENCOUNTER — TELEPHONE (OUTPATIENT)
Dept: INTERNAL MEDICINE | Facility: CLINIC | Age: 64
End: 2019-01-09

## 2019-01-09 NOTE — TELEPHONE ENCOUNTER
Spouse calling as patient home reading BP have been up and down.    Over the last 3 days BP has been elevated to around 160/100; Patient has c/o headache, dizziness, fatigue, and blurry vision. HA was a little better today. Blurry vision has lessened. Patient reports no SOB. No nausea or vomiting. No chest pain.     Today's BP reported as 140/90. Med list reviewed patient has increased her dose as recommend back in July ( Atenolol 50mg). Spouse stated patient has increased does to 75mg to help over the last few days.    BP Readings from Last 3 Encounters:   05/22/18 110/66   04/09/18 (!) 158/104   05/16/17 130/80     Advised to be seen in ER if patient is symptomatic today; spouse stated would like to wait until tomorrow morning as scheduled as BP reading was 's. Reasons to be seen in ER discussed with spouse.    He expressed understanding and acceptance of the plan.   Advised can call back to clinic at any time with concerns.     Saima FERNANDEZ, RN, BSN, PHN

## 2019-01-10 ENCOUNTER — OFFICE VISIT (OUTPATIENT)
Dept: INTERNAL MEDICINE | Facility: CLINIC | Age: 64
End: 2019-01-10
Payer: COMMERCIAL

## 2019-01-10 VITALS
WEIGHT: 166 LBS | BODY MASS INDEX: 29.41 KG/M2 | RESPIRATION RATE: 16 BRPM | HEIGHT: 63 IN | HEART RATE: 64 BPM | SYSTOLIC BLOOD PRESSURE: 144 MMHG | TEMPERATURE: 98 F | OXYGEN SATURATION: 99 % | DIASTOLIC BLOOD PRESSURE: 94 MMHG

## 2019-01-10 DIAGNOSIS — I10 HTN, GOAL BELOW 140/90: Primary | ICD-10-CM

## 2019-01-10 PROCEDURE — 99214 OFFICE O/P EST MOD 30 MIN: CPT | Performed by: PHYSICIAN ASSISTANT

## 2019-01-10 RX ORDER — HYDROCHLOROTHIAZIDE 12.5 MG/1
12.5 CAPSULE ORAL DAILY
Qty: 30 CAPSULE | Refills: 1 | Status: SHIPPED | OUTPATIENT
Start: 2019-01-10 | End: 2019-06-10

## 2019-01-10 ASSESSMENT — MIFFLIN-ST. JEOR: SCORE: 1278.13

## 2019-01-10 NOTE — PATIENT INSTRUCTIONS
Patient Education     Uncontrolled High Blood Pressure (Established)    Your blood pressure was unusually high today. This can occur if you ve missed doses of your blood pressure medicine. Or it can happen if you are taking other medicines. These include some asthma inhalers, decongestants, diet pills, and street drugs like cocaine and amphetamine.  Other causes include:    Weight gain    More salt in your diet    Smoking    Caffeine  Your blood pressure can also rise if you are emotionally upset or in intense pain. It may go back to normal after a period of rest.  Blood pressure measurements are given as 2 numbers. Systolic blood pressure is the upper number. This is the pressure when the heart contracts. Diastolic blood pressure is the lower number. This is the pressure when the heart relaxes between beats. You will see your blood pressure readings written together. For example, a person with a systolic pressure of 118 and a diastolic pressure of 78 will have 118/78 written in the medical record. To be high blood pressure, the numbers must be higher when tested over a period of time.  Blood pressure is categorized as normal, elevated, or stage 1 or stage 2 high blood pressure:    Normal blood pressure is systolic of less than 120 and diastolic of less than 80 (120/80)    Elevated blood pressure is systolic of 120 to 129 and diastolic less than 80    Stage 1 high blood pressure is systolic is 130 to 139 or diastolic between 80 to 89    Stage 2 high blood pressure is when systolic is 140 or higher or the diastolic is 90 or higher  Uncontrolled high blood pressure can cause serious health problems. It raises your risk for heart attack, stroke, and heart failure. In general, if you have high blood pressure, keeping your blood pressure below 130/80 mmHg may help prevent these problems. Your healthcare provider may prescribe medicine to help control blood pressure if lifestyle changes are not enough.  Home care  It s  important to take steps to lower your blood pressure. If you are taking blood pressure medicine, the guidelines below may help you need less or no medicines in the future.    Start a weight-loss program if you are overweight.    Cut back on the amount of salt in your diet:  ? Avoid high-salt foods like olives, pickles, smoked meats, and salted potato chips.  ? Don t add salt to your food at the table.  ? Use only small amounts of salt when cooking.    Start an exercise program. Talk with your healthcare provider about what exercise program is best for you. It doesn t have to be difficult. Even brisk walking for 20 minutes 3 times a week is a good form of exercise.    Avoid medicines that stimulates the heart. This includes many over-the-counter cold and sinus decongestant pills and sprays, as well as diet pills. Check the warnings about high blood pressure on the label. Before purchasing any over-the-counter medicines or supplements, always ask the pharmacist about the product's potential interaction with your high blood pressure and your medicines.    Stimulants such as amphetamine or cocaine could be lethal for someone with high blood pressure. Never take these.    Limit how much caffeine you drink. Or switch to noncaffeinated beverages.    Stop smoking. If you are a long-time smoker, this can be hard. Enroll in a stop-smoking program to make it more likely that you will succeed. Talk with your provider about ways to quit.    Learn how to handle stress better. This is an important part of any program to lower blood pressure. Learn ways to relax. These include meditation, yoga, and biofeedback.    If medicines were prescribed, take them exactly as directed. Missing doses may cause your blood pressure to get out of control.    If you miss a dose or doses of your medicines, check with your healthcare provider or pharmacist about what to do.    Consider buying an automatic blood pressure machine. Your provider may  recommend a certain type. You can get one of these at most pharmacies. Measure your blood pressure twice a day, in the morning, and in the late afternoon. Keep a written record of your home blood pressure readings and take the record to your medical appointments.  Here are some additional guidelines on home blood pressure monitoring from the American [employer] Association.    Don't smoke or drink coffee for 30 minutes    Go to the bathroom before the test.    Relax for 5 minutes before taking the measurement.    Sit correctly. Be sure your back is supported. Don't sit on a couch or soft chair. Uncross your feet and place them flat on the floor. Place your arm on a solid, flat surface like a table with the upper arm at heart level. Make certain the middle of the cuff is directly above the bend of the elbow. Check the monitor's instruction manual for an illustration.    Take multiple readings. When you measure, take 2 or 3 readings one minute apart and record all of the results.    Take your blood pressure at the same time every day, or as your healthcare provider recommends.    Record the date, time, and blood pressure reading.    Take the record with you to your next appointment. If your blood pressure monitor has a built-in memory, simply take the monitor with you to your next appointment.    Call your provider if you have several high readings. Don't be frightened by a single high reading, but if you get several high readings, check in with your healthcare provider.    Note: When blood pressure reaches a systolic (top number) of 180 or higher or a diastolic (bottom number) of 110 or higher, emergency medical treatment is required. Call your healthcare provider immediately.  Follow-up care  Regular visits to your own healthcare provider for blood pressure and medicine checks are an important part of your care. Make a follow-up appointment as directed. Bring the record of your home blood pressure readings to the  appointment.  When to seek medical advice  Call your healthcare provider right away if any of these occur:    Blood pressure reaches a systolic (top number) of 180 or higher or diastolic (bottom number) of 110 or higher, emergency medical treatment is required.    Chest, arm, shoulder, neck, or upper back pain    Shortness of breath    Severe headache    Throbbing or rushing sound in the ears    Nosebleed    Extreme drowsiness, confusion, or fainting    Dizziness or dizziness with spinning sensation (vertigo)    Weakness in an arm or leg or on one side of the face    Trouble speaking or seeing   Date Last Reviewed: 1/1/2017 2000-2018 [employer]. [address] [place] [address]. All rights reserved. This information is not intended as a substitute for professional medical care. Always follow your healthcare professional's instructions.

## 2019-01-10 NOTE — PROGRESS NOTES
"  SUBJECTIVE:   Mulu Knowles is a 63 year old female who presents to clinic today for the following health issues:    Hypertension Follow-up      Outpatient blood pressures are being checked at home. (Only last night)  Results are 150/110.    Low Salt Diet: no added salt    Has been having headaches almost every day    Problem list and histories reviewed & adjusted, as indicated.  Additional history: Patient hasn't taken any OTC medication for HA.  Diet reviewed.  Patient presents with her  who interprets for her.        ROS:  Constitutional, HEENT, cardiovascular, pulmonary, gi and gu systems are negative, except as otherwise noted.    OBJECTIVE:     BP (!) 144/94   Pulse 64   Temp 98  F (36.7  C) (Oral)   Resp 16   Ht 1.594 m (5' 2.75\")   Wt 75.3 kg (166 lb)   SpO2 99%   Breastfeeding? No   BMI 29.64 kg/m    Body mass index is 29.64 kg/m .  GENERAL: healthy, alert and no distress  NECK: no adenopathy, no asymmetry, masses, or scars and thyroid normal to palpation  RESP: lungs clear to auscultation - no rales, rhonchi or wheezes  CV: regular rate and rhythm, normal S1 S2, no S3 or S4, no murmur, click or rub, no peripheral edema and peripheral pulses strong  MS: no gross musculoskeletal defects noted, no edema  SKIN: no suspicious lesions or rashes    Diagnostic Test Results:  none     ASSESSMENT/PLAN:   1. HTN, goal below 140/90  - hydrochlorothiazide (MICROZIDE) 12.5 MG capsule; Take 1 capsule (12.5 mg) by mouth daily  Dispense: 30 capsule; Refill: 1    Use medication as directed.  BP checks daily  Ice 20 min 3 x daily the area of HA pain  Follow up in 2 weeks.  Follow up sooner if HA persists  Patient amenable to this follow up plan.     Jessica Cuevas PA-C  [facility]  "

## 2019-02-01 DIAGNOSIS — R30.0 DYSURIA: ICD-10-CM

## 2019-02-01 DIAGNOSIS — I10 BENIGN ESSENTIAL HYPERTENSION: ICD-10-CM

## 2019-02-01 RX ORDER — NITROFURANTOIN 25; 75 MG/1; MG/1
100 CAPSULE ORAL 2 TIMES DAILY
Qty: 6 CAPSULE | Refills: 0 | Status: SHIPPED | OUTPATIENT
Start: 2019-02-01 | End: 2019-02-04

## 2019-02-01 RX ORDER — ATENOLOL 50 MG/1
50 TABLET ORAL DAILY
Qty: 90 TABLET | Refills: 1 | Status: SHIPPED | OUTPATIENT
Start: 2019-02-01 | End: 2019-09-20

## 2019-02-01 NOTE — TELEPHONE ENCOUNTER
"Requested Prescriptions   Pending Prescriptions Disp Refills     atenolol (TENORMIN) 50 MG tablet 90 tablet 1     Sig: Take 1 tablet (50 mg) by mouth daily    Beta-Blockers Protocol Failed - 2/1/2019 11:23 AM       Failed - Blood pressure under 140/90 in past 12 months    BP Readings from Last 3 Encounters:   01/10/19 (!) 144/94   05/22/18 110/66   04/09/18 (!) 158/104                Failed - Recent (12 mo) or future (30 days) visit within the authorizing provider's specialty    Patient had office visit in the last 12 months or has a visit in the next 30 days with authorizing provider or within the authorizing provider's specialty.  See \"Patient Info\" tab in inbasket, or \"Choose Columns\" in Meds & Orders section of the refill encounter.             Passed - Patient is age 6 or older       Passed - Medication is active on med list        nitroFURantoin macrocrystal-monohydrate (MACROBID) 100 MG capsule 6 capsule 0     Sig: Take 1 capsule (100 mg) by mouth 2 times daily    There is no refill protocol information for this order        Routing refill request to provider for review/approval because:  Drug not on the [facility] refill protocol   out of range:  blood pressure          "

## 2019-02-01 NOTE — TELEPHONE ENCOUNTER
Patient is requesting 3 month supply of medication.    Please send if applicable to pharmacy    Thank you!  Yessenia Peace CPhT   [facility]

## 2019-06-07 DIAGNOSIS — I10 HTN, GOAL BELOW 140/90: ICD-10-CM

## 2019-06-07 NOTE — TELEPHONE ENCOUNTER
"Requested Prescriptions   Pending Prescriptions Disp Refills     hydrochlorothiazide (MICROZIDE) 12.5 MG capsule 30 capsule 1     Sig: Take 1 capsule (12.5 mg) by mouth daily   Last Written Prescription Date:  1/10/2019  Last Fill Quantity: 30,  # refills: 1   Last Office Visit: 1/10/2019   Future Office Visit:         Diuretics (Including Combos) Protocol Failed - 6/7/2019  3:38 PM        Failed - Blood pressure under 140/90 in past 12 months     BP Readings from Last 3 Encounters:   01/10/19 (!) 144/94   05/22/18 110/66   04/09/18 (!) 158/104                 Failed - Recent (12 mo) or future (30 days) visit within the authorizing provider's specialty     Patient had office visit in the last 12 months or has a visit in the next 30 days with authorizing provider or within the authorizing provider's specialty.  See \"Patient Info\" tab in inbasket, or \"Choose Columns\" in Meds & Orders section of the refill encounter.              Failed - Normal serum creatinine on file in past 12 months     Recent Labs   Lab Test 04/09/18  1700   CR 0.72              Failed - Normal serum potassium on file in past 12 months     Recent Labs   Lab Test 04/09/18  1700   POTASSIUM 4.4                    Failed - Normal serum sodium on file in past 12 months     Recent Labs   Lab Test 04/09/18  1700                 Passed - Medication is active on med list        Passed - Patient is age 18 or older        Passed - No active pregancy on record        Passed - No positive pregnancy test in past 12 months          "

## 2019-06-10 RX ORDER — HYDROCHLOROTHIAZIDE 12.5 MG/1
12.5 CAPSULE ORAL DAILY
Qty: 30 CAPSULE | Refills: 1 | Status: SHIPPED | OUTPATIENT
Start: 2019-06-10 | End: 2019-10-30

## 2019-09-20 DIAGNOSIS — I10 BENIGN ESSENTIAL HYPERTENSION: ICD-10-CM

## 2019-09-23 NOTE — TELEPHONE ENCOUNTER
"Requested Prescriptions   Pending Prescriptions Disp Refills     atenolol (TENORMIN) 50 MG tablet [Pharmacy Med Name: ATENOLOL 50MG TABS] 90 tablet 1     Sig: TAKE ONE TABLET BY MOUTH ONCE DAILY   Last Written Prescription Date:  02/01/2019  Last Fill Quantity:  30,  # refills: 0   Last office visit: 1/10/2019 with prescribing provider:     Future Office Visit:      Beta-Blockers Protocol Failed - 9/20/2019  5:10 PM        Failed - Blood pressure under 140/90 in past 12 months     BP Readings from Last 3 Encounters:   01/10/19 (!) 144/94   05/22/18 110/66   04/09/18 (!) 158/104                 Failed - Recent (12 mo) or future (30 days) visit within the authorizing provider's specialty     Patient had office visit in the last 12 months or has a visit in the next 30 days with authorizing provider or within the authorizing provider's specialty.  See \"Patient Info\" tab in inbasket, or \"Choose Columns\" in Meds & Orders section of the refill encounter.              Passed - Patient is age 6 or older        Passed - Medication is active on med list        "

## 2019-09-24 RX ORDER — ATENOLOL 50 MG/1
TABLET ORAL
Qty: 90 TABLET | Refills: 1 | Status: SHIPPED | OUTPATIENT
Start: 2019-09-24 | End: 2019-10-30

## 2019-10-26 DIAGNOSIS — I10 HTN, GOAL BELOW 140/90: ICD-10-CM

## 2019-10-26 DIAGNOSIS — I10 BENIGN ESSENTIAL HYPERTENSION: ICD-10-CM

## 2019-10-28 NOTE — TELEPHONE ENCOUNTER
"Requested Prescriptions   Pending Prescriptions Disp Refills     hydrochlorothiazide (MICROZIDE) 12.5 MG capsule [Pharmacy Med Name: HYDROCHLOROTHIAZIDE 12.5MG CAPS]    Last Written Prescription Date:  6/10/19  Last Fill Quantity: 30 capsule,  # refills: 1   Last office visit: 1/10/2019 with prescribing provider:  Faith     Future Office Visit:     30 capsule 1     Sig: TAKE ONE CAPSULE BY MOUTH ONCE DAILY       Diuretics (Including Combos) Protocol Failed - 10/26/2019  1:45 PM        Failed - Blood pressure under 140/90 in past 12 months     BP Readings from Last 3 Encounters:   01/10/19 (!) 144/94   05/22/18 110/66   04/09/18 (!) 158/104                 Failed - Recent (12 mo) or future (30 days) visit within the authorizing provider's specialty     Patient has had an office visit with the authorizing provider or a provider within the authorizing providers department within the previous 12 mos or has a future within next 30 days. See \"Patient Info\" tab in inbasket, or \"Choose Columns\" in Meds & Orders section of the refill encounter.              Failed - Normal serum creatinine on file in past 12 months     Recent Labs   Lab Test 04/09/18  1700   CR 0.72              Failed - Normal serum potassium on file in past 12 months     Recent Labs   Lab Test 04/09/18  1700   POTASSIUM 4.4                    Failed - Normal serum sodium on file in past 12 months     Recent Labs   Lab Test 04/09/18  1700                 Passed - Medication is active on med list        Passed - Patient is age 18 or older        Passed - No active pregancy on record        Passed - No positive pregnancy test in past 12 months        "

## 2019-10-29 NOTE — TELEPHONE ENCOUNTER
Patient has not been seen in this clinic since 2015. Will route to provider that patient last saw.

## 2019-10-30 RX ORDER — HYDROCHLOROTHIAZIDE 12.5 MG/1
CAPSULE ORAL
Qty: 30 CAPSULE | Refills: 1 | OUTPATIENT
Start: 2019-10-30

## 2019-10-30 RX ORDER — HYDROCHLOROTHIAZIDE 12.5 MG/1
12.5 CAPSULE ORAL EVERY MORNING
Qty: 90 CAPSULE | Refills: 1 | Status: SHIPPED | OUTPATIENT
Start: 2019-10-30 | End: 2020-12-15

## 2019-10-30 RX ORDER — ATENOLOL 50 MG/1
50 TABLET ORAL DAILY
Qty: 90 TABLET | Refills: 1 | Status: SHIPPED | OUTPATIENT
Start: 2019-10-30 | End: 2020-02-17

## 2019-10-30 NOTE — TELEPHONE ENCOUNTER
Called with the aid of a [place]ian .    Last office visit was on 1/10/2019     Gave the message patient is confused and doesn't understand why she is needing to follow up. As it was communicated that the refills will be needed and Mulu will be out of the country from 11/4/2019-4/2020.    Offered to transfer to the main scheduling line so they can find an appointment close to there home, offered to give the number so they can call back and offered to schedule at [place].     Attempted to Warm Transferred call to the main scheduling number unfortunatly patient hung up.    Patient was given the number. Sent the encounter to the PCP team.      Allison Boyd,

## 2019-10-30 NOTE — TELEPHONE ENCOUNTER
Please contact the patient.      Prescriptions sent electronically to specified pharmacy.      I see that she is leaving the country next week for a few months.      Please have the patient return to see one of us here at [facility] or else a [place] clinic closer to her home when she returns from [facility].

## 2019-10-30 NOTE — TELEPHONE ENCOUNTER
FYI, routing to last seen provider. Looks like patient has been without medication since this summer.      Saima FERNANDEZ RN, BSN, PHN

## 2019-10-31 NOTE — TELEPHONE ENCOUNTER
notified, will schedule appt upon return.  also wondering about thyroid prescription.  Per notes, pt sees Endocrinologist for this and will contact the clinic for R/F of medication.   Ruma Edward RN

## 2020-02-14 DIAGNOSIS — I10 BENIGN ESSENTIAL HYPERTENSION: ICD-10-CM

## 2020-02-14 NOTE — TELEPHONE ENCOUNTER
"Requested Prescriptions   Pending Prescriptions Disp Refills     atenolol (TENORMIN) 50 MG tablet [Pharmacy Med Name: ATENOLOL 50MG TABS] 90 tablet 0     Sig: TAKE ONE TABLET BY MOUTH ONCE DAILY   Last Written Prescription Date:  10/30/2019  Last Fill Quantity: 90,  # refills: 01   Last office visit: 1/10/2019 with prescribing provider:     Future Office Visit:      Beta-Blockers Protocol Failed - 2/14/2020  2:46 PM        Failed - Blood pressure under 140/90 in past 12 months     BP Readings from Last 3 Encounters:   01/10/19 (!) 144/94   05/22/18 110/66   04/09/18 (!) 158/104                 Failed - Recent (12 mo) or future (30 days) visit within the authorizing provider's specialty     Patient has had an office visit with the authorizing provider or a provider within the authorizing providers department within the previous 12 mos or has a future within next 30 days. See \"Patient Info\" tab in inbasket, or \"Choose Columns\" in Meds & Orders section of the refill encounter.              Passed - Patient is age 6 or older        Passed - Medication is active on med list        "

## 2020-02-15 NOTE — TELEPHONE ENCOUNTER
Routing refill request to provider for review/approval because:  Patient needs to be seen because: per refill request on 10/26/19: patient going to leave the country for a few months.  Noted patient saw a FP provider on 1/10/20 for blood pressure.    Writer unsure if patient is still going to be going to this clinic.  Blood pressure elevated.

## 2020-02-17 RX ORDER — ATENOLOL 50 MG/1
TABLET ORAL
Qty: 90 TABLET | Refills: 0 | Status: SHIPPED | OUTPATIENT
Start: 2020-02-17 | End: 2021-02-22 | Stop reason: ALTCHOICE

## 2020-11-03 ENCOUNTER — TRANSFERRED RECORDS (OUTPATIENT)
Dept: HEALTH INFORMATION MANAGEMENT | Facility: CLINIC | Age: 65
End: 2020-11-03

## 2020-11-03 LAB — TSH SERPL-ACNC: 123.46 UIU/ML (ref 0.6–5)

## 2020-12-14 DIAGNOSIS — I10 HTN, GOAL BELOW 140/90: ICD-10-CM

## 2020-12-15 RX ORDER — HYDROCHLOROTHIAZIDE 12.5 MG/1
CAPSULE ORAL
Qty: 90 CAPSULE | Refills: 0 | Status: SHIPPED | OUTPATIENT
Start: 2020-12-15 | End: 2021-02-22 | Stop reason: ALTCHOICE

## 2020-12-15 NOTE — TELEPHONE ENCOUNTER
Patient advised and agrees to plan via [facility] [phone]. Interpretor repeated message multiple times to patient and patient states he understand he must have an appointment prior to additional fills.

## 2021-02-21 ENCOUNTER — TELEPHONE (OUTPATIENT)
Dept: NURSING | Facility: CLINIC | Age: 66
End: 2021-02-21

## 2021-02-21 DIAGNOSIS — I10 BENIGN ESSENTIAL HYPERTENSION: ICD-10-CM

## 2021-02-21 NOTE — TELEPHONE ENCOUNTER
"Clinic Action Needed:YES  Reason for Call:\"My wife needs a refill on her RX   atenolol (TENORMIN) 50 MG tablet 90 tablet 0 2/17/2020  No   Sig: TAKE ONE TABLET BY MOUTH ONCE DAILY       Patient Recommendations/Teaching:Per epic patient has not been seen in clinic since 1/10/2019. Caller states they have a telephone appt tomorrow 2/21. Advised I will route message to PCP, but an in person OV might be needed .  Routed to:PCP  Sneha Lucero RN [facility]          "

## 2021-02-22 ENCOUNTER — VIRTUAL VISIT (OUTPATIENT)
Dept: INTERNAL MEDICINE | Facility: CLINIC | Age: 66
End: 2021-02-22
Payer: COMMERCIAL

## 2021-02-22 VITALS — DIASTOLIC BLOOD PRESSURE: 80 MMHG | SYSTOLIC BLOOD PRESSURE: 138 MMHG

## 2021-02-22 DIAGNOSIS — E06.3 HYPOTHYROIDISM DUE TO HASHIMOTO'S THYROIDITIS: ICD-10-CM

## 2021-02-22 DIAGNOSIS — I10 BENIGN ESSENTIAL HYPERTENSION: Primary | ICD-10-CM

## 2021-02-22 PROCEDURE — 99214 OFFICE O/P EST MOD 30 MIN: CPT | Mod: 95 | Performed by: INTERNAL MEDICINE

## 2021-02-22 RX ORDER — LEVOTHYROXINE SODIUM 88 UG/1
88 TABLET ORAL DAILY
COMMUNITY
Start: 2021-02-22 | End: 2023-05-22

## 2021-02-22 RX ORDER — ATENOLOL AND CHLORTHALIDONE TABLET 50; 25 MG/1; MG/1
1 TABLET ORAL EVERY MORNING
Qty: 90 TABLET | Refills: 0 | Status: SHIPPED | OUTPATIENT
Start: 2021-02-22 | End: 2021-02-25 | Stop reason: ALTCHOICE

## 2021-02-22 NOTE — PROGRESS NOTES
"Mulu is a 65 year old who is being evaluated via a billable telephone visit.      What phone number would you like to be contacted at? [phone]  How would you like to obtain your AVS? Mail a copy    Assessment & Plan     Benign essential hypertension  probablky stable  Change to one daily combination tablet for better compliance  Return to see me in approximately 1 month, sooner if needed.  Please get fasting labs done in the [facility] lab 1-2 days before this appointment (make a  \"lab appointment\").  Eat nothing for at least 8 hours prior to having these labs drawn.  Use [employer] or Call [phone] to schedule the appointment with me and lab appointment.   - atenolol-chlorthalidone (TENORETIC) 50-25 MG tablet; Take 1 tablet by mouth every morning  - CBC with platelets; Future  - Basic metabolic panel; Future  - Lipid panel reflex to direct LDL Fasting; Future    Hypothyroidism due to Hashimoto's thyroiditis  Continue same meds.   - levothyroxine (SYNTHROID/LEVOTHROID) 88 MCG tablet; Take 1 tablet (88 mcg) by mouth daily  - TSH with free T4 reflex; Future                 Return in 29 days (on 3/23/2021) for with pre-visit fasting labs.    Bay Cyr MD  [facility]    Subjective   Mulu is a 65 year old who presents for the following health issues, visit conducted through [place] interpretor (ID #[id number])    HPI     Has not been seen in [facility] clinic since 2019    Followed by Endocrinology Clinic for hypothryoidism, reviewed their notes and labs from last 2 years    Hypertension Follow-up      Do you check your blood pressure regularly outside of the clinic? Yes     Are you following a low salt diet? No    Are your blood pressures ever more than 140 on the top number (systolic) OR more   than 90 on the bottom number (diastolic), for example 140/90? Yes, occasionally       How many servings of fruits and vegetables do you eat daily?  2-3    On average, how many sweetened " "beverages do you drink each day (Examples: soda, juice, sweet tea, etc.  Do NOT count diet or artificially sweetened beverages)?   0    How many days per week do you miss taking your medication? 0      BP Readings from Last 6 Encounters:   02/22/21 138/80   01/10/19 (!) 144/94   05/22/18 110/66   04/09/18 (!) 158/104   05/16/17 130/80   09/15/15 137/89     Recent BPs at Endocrinology Clinic:  11/2/20 122/74  2/4/21  138/80      2.  History of hypothyroidism.  On replacement therapy.  She has not experienced any significant side effects of this medication.   The patient denies of fatigue, weight changes, heat/cold intolerance, hair changes, nail changes, bowel changes.     Latest labs reviewed:    From   **I reviewed the information recorded in the patient's EPIC chart (including but not limited to medical history, surgical history, family history, problem list, medication list, and allergy list) and updated the information as indicated based on the patients reported information.         Review of Systems   Constitutional, HEENT, cardiovascular, pulmonary, gi and gu systems are negative, except as otherwise noted.      Objective    Vitals - Patient Reported  Systolic (Patient Reported): (!) 154  Diastolic (Patient Reported): 84  Weight (Patient Reported): 72.6 kg (160 lb)  Height (Patient Reported): 159.4 cm (5' 2.75\")  BMI (Based on Pt Reported Ht/Wt): 28.57  Pulse (Patient Reported): 76      Vitals:  No vitals were obtained today due to virtual visit.    Physical Exam   healthy, alert and no distress  PSYCH: Alert and oriented times 3; coherent speech, normal   rate and volume, able to articulate logical thoughts, able   to abstract reason, no tangential thoughts, no hallucinations   or delusions  Her affect is normal  RESP: No cough, no audible wheezing, able to talk in full sentences  Remainder of exam unable to be completed due to telephone visits                Phone call duration: 15:45 minutes  "

## 2021-02-22 NOTE — TELEPHONE ENCOUNTER
Per chart, this medication was filled by provider that patient had an appointment with. Will close encounter.

## 2021-02-22 NOTE — PATIENT INSTRUCTIONS
*  Stop Atenolol tablet    *  Stop Hydrochlorathizadie    *  Change the blood pressure medication to Atenolol/Chlorthalidone 50/25 mg, ONE tablet in the morning.    This single tablet has both of the current medications that you are currently taking in one tablet.     *  Continue the Levothyroxine at 88 mcg.      *  Return to see me in the office on Tuesday 3/23/21 @ 900 am , (fasting lab appointment in the [facility] @ 0830 am).   Eat nothing for at least 8 hours prior to having these labs drawn.

## 2021-02-23 RX ORDER — ATENOLOL 50 MG/1
TABLET ORAL
Qty: 90 TABLET | Refills: 0 | OUTPATIENT
Start: 2021-02-23

## 2021-02-24 ENCOUNTER — NURSE TRIAGE (OUTPATIENT)
Dept: NURSING | Facility: CLINIC | Age: 66
End: 2021-02-24

## 2021-02-24 DIAGNOSIS — I10 BENIGN ESSENTIAL HYPERTENSION: Primary | ICD-10-CM

## 2021-02-24 NOTE — TELEPHONE ENCOUNTER
Triage call:      is calling- question about medication   Patient is going frequently to the bathroom  And is very tired due going to bathroom so frequently    Advised that a common side effect of a diuretic is frequent urination.      is worried that she is able to tolerate having to go to the bathroom so frequently. Would like additional advice on this medication- should she continue to take the medication or is there something else she can take?     Crista Adams RN BSN 2/24/2021 9:53 AM    COVID 19 Nurse Triage Plan/Patient Instructions    Please be aware that novel coronavirus (COVID-19) may be circulating in the community. If you develop symptoms such as fever, cough, or SOB or if you have concerns about the presence of another infection including coronavirus (COVID-19), please contact your health care provider or visit [email or web address].     Disposition/Instructions    Home care recommended. Follow home care protocol based instructions.    Thank you for taking steps to prevent the spread of this virus.  o Limit your contact with others.  o Wear a simple mask to cover your cough.  o Wash your hands well and often.    Resources    [facility]: About COVID-19: [email or web address]    CDC: What to Do If You're Sick: www.cdc.gov/coronavirus/2019-ncov/about/steps-when-sick.html    CDC: Ending Home Isolation: www.cdc.gov/coronavirus/2019-ncov/hcp/disposition-in-home-patients.html     CDC: Caring for Someone: www.cdc.gov/coronavirus/2019-ncov/if-you-are-sick/care-for-someone.html     [facility]: Interim Guidance for Hospital Discharge to Home: www.health.[facility].mn.us/diseases/coronavirus/hcp/hospdischarge.pdf    [facility] clinical trials (COVID-19 research studies): clinicalaffairs.[facility].edu/umn-clinical-trials     Below are the COVID-19 hotlines at the [facility] of Health ([facility]). Interpreters are available.   o For health questions: Call 094-832-6590 or [phone] (7  a.m. to 7 p.m.)  o For questions about schools and childcare: Call 901-688-0818 or 1-489.174.1887 (7 a.m. to 7 p.m.)       Reason for Disposition    Caller has NON-URGENT medication question about med that PCP prescribed and triager unable to answer question    Additional Information    Negative: Drug overdose and triager unable to answer question    Negative: Caller requesting information unrelated to medicine    Negative: Caller requesting a prescription for Strep throat and has a positive culture result    Negative: Rash while taking a medication or within 3 days of stopping it    Negative: Immunization reaction suspected    Negative: Asthma and having symptoms of asthma (cough, wheezing, etc.)    Negative: Breastfeeding questions about mother's medicines and diet    Negative: MORE THAN A DOUBLE DOSE of a prescription or over-the-counter (OTC) drug    Negative: DOUBLE DOSE (an extra dose or lesser amount) of over-the-counter (OTC) drug and any symptoms (e.g., dizziness, nausea, pain, sleepiness)    Negative: DOUBLE DOSE (an extra dose or lesser amount) of prescription drug and any symptoms (e.g., dizziness, nausea, pain, sleepiness)    Negative: Took another person's prescription drug    Negative: DOUBLE DOSE (an extra dose or lesser amount) of prescription drug and NO symptoms (Exception: a double dose of antibiotics)    Negative: Diabetes drug error or overdose (e.g., took wrong type of insulin or took extra dose)    Negative: Caller has medication question about med not prescribed by PCP and triager unable to answer question (e.g., compatibility with other med, storage)    Negative: Request for URGENT new prescription or refill of 'essential' medication (i.e., likelihood of harm to patient if not taken) and triager unable to fill per department policy    Negative: Prescription not at pharmacy and was prescribed today by PCP    Negative: Pharmacy calling with prescription questions and triager unable to answer  question    Negative: Caller has urgent medication question about med that PCP prescribed and triager unable to answer question    Protocols used: MEDICATION QUESTION CALL-A-OH

## 2021-02-24 NOTE — TELEPHONE ENCOUNTER
"I prescribed the same diuretic medication and Atenolol that she has been previously taking, I just put the two ingredients into one tablet.     Did she have any specific problems with this diuretic medication in the past?    If not, then take the medication as ordered.     This diuretic is a low potency and low dose, and may possibly cause more frequent urination, but not likely.   Take it in the morning.     If she had problems with the diuretic specifically producing bothersome symptoms, then let me know.    I would just try it for the next few weeks and then discuss it at her follow up appointment.     Return to see her usual primary MD at the [facility] (Dr. Esquivel) or someone at our clinic in approximately 1 month, sooner if needed.  If possible, please get fasting labs done 1-2 days before this appointment (make a  \"lab appointment\").  Eat nothing for at least 8 hours prior to having these labs drawn.    Call [phone]) to schedule these appointments  "

## 2021-02-25 RX ORDER — ATENOLOL 50 MG/1
50 TABLET ORAL DAILY
Qty: 90 TABLET | Refills: 0 | Status: SHIPPED | OUTPATIENT
Start: 2021-02-25 | End: 2021-03-23 | Stop reason: ALTCHOICE

## 2021-02-25 NOTE — TELEPHONE ENCOUNTER
Ok to stop the  Atenolol/Chlorthalidone and change to just plain atenolol 50 mg once per day.     (Do not throw away the Atenolol/Chlorthalidone tablets, they may be used in the future.)  The goal for for BP in 100-140 more often than not    New RX sent.  Med list updated.   This Atenolol is a very inexpensive medication so it will not cost much.     Follow up with her usual primary MD in about one month.  The medicaitons can be adjusted as needed.   Bring the home BP cuff to the appointment for comparison.      Close encounter when done.

## 2021-02-25 NOTE — TELEPHONE ENCOUNTER
Spoke with spouse.     No previous issues with diuretics. Per spouse patient complained of the frequent urination especially at night. So patient wanted to go back on just plain Atenolol.    Triage advised to continue Atenolol with Chlorthalidone. If patient reports other symptoms to let us know as soon as possible.    BP has gone down 100/61, today. She was a little dizzy this morning but now she is ok.    Ok to follow up as planned? Spouse was trying to have patient stated an OTC Potassium Gluconate supplement, encouraged to hold off until upcoming lab to check BMP.     Advised ok for potassium in diet.    Any other recommendations?    Saima GOMEZN, RN, PHN

## 2021-03-23 ENCOUNTER — OFFICE VISIT (OUTPATIENT)
Dept: INTERNAL MEDICINE | Facility: CLINIC | Age: 66
End: 2021-03-23
Payer: COMMERCIAL

## 2021-03-23 VITALS
HEART RATE: 78 BPM | HEIGHT: 63 IN | DIASTOLIC BLOOD PRESSURE: 76 MMHG | BODY MASS INDEX: 28.49 KG/M2 | SYSTOLIC BLOOD PRESSURE: 110 MMHG | WEIGHT: 160.8 LBS | OXYGEN SATURATION: 98 % | TEMPERATURE: 97.9 F

## 2021-03-23 DIAGNOSIS — E06.3 HYPOTHYROIDISM DUE TO HASHIMOTO'S THYROIDITIS: ICD-10-CM

## 2021-03-23 DIAGNOSIS — R73.01 ELEVATED FASTING GLUCOSE: ICD-10-CM

## 2021-03-23 DIAGNOSIS — I10 BENIGN ESSENTIAL HYPERTENSION: Primary | ICD-10-CM

## 2021-03-23 DIAGNOSIS — I10 BENIGN ESSENTIAL HYPERTENSION: ICD-10-CM

## 2021-03-23 LAB
ANION GAP SERPL CALCULATED.3IONS-SCNC: 2 MMOL/L (ref 3–14)
BUN SERPL-MCNC: 14 MG/DL (ref 7–30)
CALCIUM SERPL-MCNC: 9.4 MG/DL (ref 8.5–10.1)
CHLORIDE SERPL-SCNC: 103 MMOL/L (ref 94–109)
CHOLEST SERPL-MCNC: 190 MG/DL
CO2 SERPL-SCNC: 33 MMOL/L (ref 20–32)
CREAT SERPL-MCNC: 0.83 MG/DL (ref 0.52–1.04)
ERYTHROCYTE [DISTWIDTH] IN BLOOD BY AUTOMATED COUNT: 12.2 % (ref 10–15)
GFR SERPL CREATININE-BSD FRML MDRD: 74 ML/MIN/{1.73_M2}
GLUCOSE SERPL-MCNC: 133 MG/DL (ref 70–99)
HBA1C MFR BLD: 6.2 % (ref 0–5.6)
HCT VFR BLD AUTO: 42.5 % (ref 35–47)
HDLC SERPL-MCNC: 38 MG/DL
HGB BLD-MCNC: 13.5 G/DL (ref 11.7–15.7)
LDLC SERPL CALC-MCNC: 115 MG/DL
MCH RBC QN AUTO: 29.9 PG (ref 26.5–33)
MCHC RBC AUTO-ENTMCNC: 31.8 G/DL (ref 31.5–36.5)
MCV RBC AUTO: 94 FL (ref 78–100)
NONHDLC SERPL-MCNC: 152 MG/DL
PLATELET # BLD AUTO: 203 10E9/L (ref 150–450)
POTASSIUM SERPL-SCNC: 3.6 MMOL/L (ref 3.4–5.3)
RBC # BLD AUTO: 4.52 10E12/L (ref 3.8–5.2)
SODIUM SERPL-SCNC: 138 MMOL/L (ref 133–144)
TRIGL SERPL-MCNC: 186 MG/DL
TSH SERPL DL<=0.005 MIU/L-ACNC: 1.08 MU/L (ref 0.4–4)
WBC # BLD AUTO: 7.7 10E9/L (ref 4–11)

## 2021-03-23 PROCEDURE — 83036 HEMOGLOBIN GLYCOSYLATED A1C: CPT | Performed by: INTERNAL MEDICINE

## 2021-03-23 PROCEDURE — 85027 COMPLETE CBC AUTOMATED: CPT | Performed by: INTERNAL MEDICINE

## 2021-03-23 PROCEDURE — 80048 BASIC METABOLIC PNL TOTAL CA: CPT | Performed by: INTERNAL MEDICINE

## 2021-03-23 PROCEDURE — 80061 LIPID PANEL: CPT | Performed by: INTERNAL MEDICINE

## 2021-03-23 PROCEDURE — 36415 COLL VENOUS BLD VENIPUNCTURE: CPT | Performed by: INTERNAL MEDICINE

## 2021-03-23 PROCEDURE — 99214 OFFICE O/P EST MOD 30 MIN: CPT | Performed by: INTERNAL MEDICINE

## 2021-03-23 PROCEDURE — 84443 ASSAY THYROID STIM HORMONE: CPT | Performed by: INTERNAL MEDICINE

## 2021-03-23 RX ORDER — ATENOLOL AND CHLORTHALIDONE TABLET 50; 25 MG/1; MG/1
1 TABLET ORAL EVERY MORNING
Qty: 90 TABLET | Refills: 3 | Status: SHIPPED | OUTPATIENT
Start: 2021-03-23 | End: 2022-06-14

## 2021-03-23 ASSESSMENT — MIFFLIN-ST. JEOR: SCORE: 1244.54

## 2021-03-23 NOTE — PROGRESS NOTES
"    Assessment & Plan     (I10) Benign essential hypertension  (primary encounter diagnosis)  Comment: doing well on this dose  Initially complains of excessive urination the first couple days on the combination tablet, went back to plain atenolol, but then BP eleavted  Now back on combination tablet and tolerating fine, BP controlled.   Discussed current hypertension treatment guidelines, including indications for treatment and treatment options.  Discussed the importance for aggressive management of HTN to prevent vascular complications later.  Recommended lower fat, lower carbohydrate, and lower sodium (<2000 mg)diet.  Discussed required intervals for follow up on HTN, lab studies.  Recommened pt. follow their blood pressures outside the clinic to ensure that BPs are remaining within guidelines, and to contact me if the readings are not within guidelines on a regular basis so we can adjust treatment as needed.   Plan: atenolol-chlorthalidone (TENORETIC) 50-25 MG         tablet            (E03.8,  E06.3) Hypothyroidism due to Hashimoto's thyroiditis  Comment: labs much better  Return to see Endo as they have ordered.   She asked us to send the labs to that clinic.   Advised her never to stop the thyroid supplement unless specifically instructed to do so.   Plan:     (R73.01) Elevated fasting glucose  Comment: may have mild prediabetes at the most.   Discussed lower cabr diet,   If A1C much above 6.5, then may need meds  conitnue to monitor  Plan: Hemoglobin A1c                        BMI:   Estimated body mass index is 28.71 kg/m  as calculated from the following:    Height as of this encounter: 1.594 m (5' 2.75\").    Weight as of this encounter: 72.9 kg (160 lb 12.8 oz).           Return in about 1 year (around 3/23/2022) for Annual physical, Blood pressure.    Bay Cyr MD  [facility]    Elke Hardwick is a 65 year old who presents for the following health " "issues present with her .     The patient does not speak English as their primary Language.  Today's visit was assisted via an cambodian phone interpretor.       HPI     Hypertension Follow-up      Do you check your blood pressure regularly outside of the clinic? No , not regularly only checks when not feeling well    Are you following a low salt diet? Yes    Are your blood pressures ever more than 140 on the top number (systolic) OR more   than 90 on the bottom number (diastolic), for example 140/90? n/a      No diarrhea, No changes in BM patterns, no constipation, no nausea or vomiting, no melena, no hematochezia,   No changes with symptoms from food, eating, or elimination.   No fevers, no chills.   No changes in appetite or intake; no dysphagia.  No new or worsened GERD or heartburn symptoms.      No travels, noone else in family with similar symptoms.   There are no specific provocative features, no specific palliative features other than \"moving around\"  She has not had these symptoms before.   No urinary sx.   No pelvic pain, no vaginal d/c or bleeding.    2.  History of hypothyroidism.  On replacement therapy through Endocrinology clinic.   She was not taking her thyrid meds last fall (TSH quite high), has not resumed these meds.   She has not experienced any significant side effects of this medication.   The patient denies of fatigue, weight changes, heat/cold intolerance, hair changes, nail changes, bowel changes.     Latest labs reviewed:    Lab Results   Component Value Date    TSH 1.08 03/23/2021    .46 11/03/2020    TSH 1.77 04/09/2018    TSH <0.02 02/18/2014    TSH <0.02 06/01/2012    TSH <0.02 05/14/2012            Review of Systems   Constitutional, HEENT, cardiovascular, pulmonary, gi and gu systems are negative, except as otherwise noted.      Objective    /76   Pulse 78   Temp 97.9  F (36.6  C) (Temporal)   Ht 1.594 m (5' 2.75\")   Wt 72.9 kg (160 lb 12.8 oz)   SpO2 98%   " BMI 28.71 kg/m    Body mass index is 28.71 kg/m .  Physical Exam   GENERAL alert and no distress  EYES:  Normal sclera,conjunctiva, EOMI  HENT: oral and posterior pharynx without lesions or erythema, facies symmetric  NECK: Neck supple. No LAD, without thyroidmegaly.  RESP: Clear to ausculation bilaterally without wheezes or crackles. Normal BS in all fields.  CV: RRR normal S1S2 without murmurs, rubs or gallops.  LYMPH: no cervical lymph adenopathy appreciated  MS: extremities- no gross deformities of the visible extremities noted,   EXT:  no lower extremity edema  PSYCH: Alert and oriented times 3; speech- coherent  SKIN:  No obvious significant skin lesions on visible portions of face

## 2021-03-23 NOTE — PATIENT INSTRUCTIONS
*  Blood pressure Continue all medications at the same doses.  Contact your usual pharmacy if you need refills.     *  Follow endocrinology clinic (thyroid specialists) when you run out of medications.   Your thyroid levels are back to normal.     *  Get the covid vaccine

## 2021-03-30 ENCOUNTER — IMMUNIZATION (OUTPATIENT)
Dept: NURSING | Facility: CLINIC | Age: 66
End: 2021-03-30
Payer: COMMERCIAL

## 2021-03-30 PROCEDURE — 0001A PR COVID VAC PFIZER DIL RECON 30 MCG/0.3 ML IM: CPT

## 2021-03-30 PROCEDURE — 91300 PR COVID VAC PFIZER DIL RECON 30 MCG/0.3 ML IM: CPT

## 2021-04-20 ENCOUNTER — IMMUNIZATION (OUTPATIENT)
Dept: NURSING | Facility: CLINIC | Age: 66
End: 2021-04-20
Attending: INTERNAL MEDICINE
Payer: COMMERCIAL

## 2021-04-20 PROCEDURE — 91300 PR COVID VAC PFIZER DIL RECON 30 MCG/0.3 ML IM: CPT

## 2021-04-20 PROCEDURE — 0002A PR COVID VAC PFIZER DIL RECON 30 MCG/0.3 ML IM: CPT

## 2021-09-21 ENCOUNTER — OFFICE VISIT (OUTPATIENT)
Dept: FAMILY MEDICINE | Facility: CLINIC | Age: 66
End: 2021-09-21
Payer: COMMERCIAL

## 2021-09-21 VITALS
BODY MASS INDEX: 28.75 KG/M2 | TEMPERATURE: 97.9 F | RESPIRATION RATE: 12 BRPM | SYSTOLIC BLOOD PRESSURE: 102 MMHG | DIASTOLIC BLOOD PRESSURE: 70 MMHG | WEIGHT: 161 LBS | OXYGEN SATURATION: 99 % | HEART RATE: 65 BPM

## 2021-09-21 DIAGNOSIS — Z71.84 ENCOUNTER FOR COUNSELING FOR TRAVEL: Primary | ICD-10-CM

## 2021-09-21 DIAGNOSIS — Z23 NEED FOR HEPATITIS A IMMUNIZATION: ICD-10-CM

## 2021-09-21 DIAGNOSIS — Z23 NEED FOR IMMUNIZATION AGAINST TYPHOID: ICD-10-CM

## 2021-09-21 PROCEDURE — 90472 IMMUNIZATION ADMIN EACH ADD: CPT

## 2021-09-21 PROCEDURE — 99401 PREV MED CNSL INDIV APPRX 15: CPT | Mod: 25 | Performed by: PHYSICIAN ASSISTANT

## 2021-09-21 PROCEDURE — 90632 HEPA VACCINE ADULT IM: CPT | Performed by: PHYSICIAN ASSISTANT

## 2021-09-21 PROCEDURE — 90691 TYPHOID VACCINE IM: CPT

## 2021-09-21 PROCEDURE — U0003 INFECTIOUS AGENT DETECTION BY NUCLEIC ACID (DNA OR RNA); SEVERE ACUTE RESPIRATORY SYNDROME CORONAVIRUS 2 (SARS-COV-2) (CORONAVIRUS DISEASE [COVID-19]), AMPLIFIED PROBE TECHNIQUE, MAKING USE OF HIGH THROUGHPUT TECHNOLOGIES AS DESCRIBED BY CMS-2020-01-R: HCPCS | Performed by: PHYSICIAN ASSISTANT

## 2021-09-21 PROCEDURE — 90471 IMMUNIZATION ADMIN: CPT | Performed by: PHYSICIAN ASSISTANT

## 2021-09-21 PROCEDURE — U0005 INFEC AGEN DETEC AMPLI PROBE: HCPCS | Performed by: PHYSICIAN ASSISTANT

## 2021-09-21 RX ORDER — AZITHROMYCIN 500 MG/1
TABLET, FILM COATED ORAL
Qty: 3 TABLET | Refills: 0 | Status: SHIPPED | OUTPATIENT
Start: 2021-09-21 | End: 2022-08-23

## 2021-09-21 NOTE — PROGRESS NOTES
SUBJECTIVE: Mulu Knowles , a 66 year old  female, presents for counseling and information regarding upcoming travel to [place]. Special medical concerns include: none. She anticipates the following unusual exposures: none.    Itinerary:  [facility]    Departure Date: 9/23/2021 Return date: TBD    Reason for travel (i.e. Business, pleasure): pleasure    Visiting an urban or rural area?: both    Accommodations (i.e. hotel, hostel, friends, family, etc): famiy    Women - First day of your last period: na    IMMUNIZATION HISTORY  Have you received any vaccinations in the past 4 weeks?  No  Have you ever fainted from having your blood drawn or from an injection?  No  Have you ever had a fever reaction to vaccination?  No  Have you ever had any bad reaction or side effect from any vaccination?  No  Have you ever had hepatitis A or B vaccine?  No  Do you live (or work closely) with anyone who has AIDS, an AIDS-like condition, any other immune disorder or who is on chemotherapy for cancer?  No  Have you received any injection of immune globulin or any blood products during the past 12 months?  No    GENERAL MEDICAL HISTORY  Do you have a medical condition that warrants maintenance medication or physician follow-up?  No  Do you have a medical condition that is stable now, but that may recur while traveling?  No  Has your spleen been removed?  No  Have you had an acute illness or a fever in the past 48 hours?  No  Are you pregnant, or might you become pregnant on this trip?  Any chance of pregnancy?  No  Are you breastfeeding?  No  Do you have HIV, AIDS, an AIDS-like condition, any other immune disorder, leukemia or cancer?  No  Do you have a severe combined immunodeficiency disease?  No  Have you had your thymus gland removed or history of problems with your thymus, such as myasthenia gravis, DiGeorge syndrome, or thymoma?  No    Do you have severe thrombocytopenia (low platelet count) or a coagulation disorder?  No  Have you  ever had a convulsion, seizure, epilepsy, neurologic condition or brain infection?  No  Do you have any stomach conditions?  No  Do you have a G6PD deficiency?  No  Do you have severe renal or kidney impairment?  No  Do you have a history of psychiatric problems?  No  Do you have a problem with strange dreams and/or nightmares?  No  Do you have insomnia?  No  Do you have problems with vaginitis?  No  Do you have psoriasis?  No  Are you prone to motion sickness?  No  Have you ever had headaches, nausea, vomiting, or breathing problems from altitude exposure?  No      Past Medical History:   Diagnosis Date     Allergy      Benign essential hypertension 2018     Hypothyroidism due to Hashimoto's thyroiditis 06/02/2012      Immunization History   Administered Date(s) Administered     COVID-19,PF,Pfizer 03/30/2021, 04/20/2021     MMR 08/15/2006     TDAP Vaccine (Adacel) 05/16/2017     Tdap (Adacel,Boostrix) 08/15/2006     Varicella 08/15/2006       Current Outpatient Medications   Medication Sig Dispense Refill     atenolol-chlorthalidone (TENORETIC) 50-25 MG tablet Take 1 tablet by mouth every morning 90 tablet 3     B Complex Vitamins (B COMPLEX PO)        levothyroxine (SYNTHROID/LEVOTHROID) 88 MCG tablet Take 1 tablet (88 mcg) by mouth daily       ranitidine (ZANTAC) 150 MG tablet Take 1 tablet (150 mg) by mouth 2 times daily 60 tablet 11     Allergies   Allergen Reactions     Bactrim [Sulfamethoxazole W-Trimethoprim] Hives     hives     Gaviscon [Foaming Antacid]      hives     Rice      Seafood      Advil [Ibuprofen] Itching and Rash     Ampicillin Itching and Rash     Penicillins Itching and Rash     Tylenol [Acetaminophen] Itching and Rash        EXAM: deferred    Immunizations discussed include: Hepatitis A and Typhoid  Malaraia prophylaxis recommended: declined  Symptomatic treatment for traveler's diarrhea: bismuth subsalicylate, loperamide/diphenoxylate and azithromycin    ASSESSMENT/PLAN:    (Z71.84)  Encounter for counseling for travel  (primary encounter diagnosis)    Comment: Hepatitis A and typhoid vaccines today. Patient will return or follow-up with PCP in 6 months for Hep A booster. Prophylaxis given for Traveler's diarrhea and declined Malaria. All questions were answered.     Plan: Asymptomatic COVID-19 Virus (Coronavirus) by         PCR Nose, azithromycin (ZITHROMAX) 500 MG         tablet            (Z23) Need for hepatitis A immunization  Comment:   Plan: HEPATITIS A VACCINE (ADULT)            (Z23) Need for immunization against typhoid  Comment:   Plan: TYPHOID VACCINE, IM              I have reviewed general recommendations for safe travel   including: food/water precautions, insect avoidance, safe sex   practices given high prevalence of HIV and other STDs,   roadway safety. Educational materials and links to the CDC   Traveler's health website have been provided.    Total time 15 minutes, greater than 50 percent in counseling   and coordination of care.

## 2021-09-22 LAB — SARS-COV-2 RNA RESP QL NAA+PROBE: NEGATIVE

## 2021-10-07 ENCOUNTER — PATIENT OUTREACH (OUTPATIENT)
Dept: GERIATRIC MEDICINE | Facility: CLINIC | Age: 66
End: 2021-10-07

## 2021-10-07 NOTE — LETTER
October 7, 2021    YUNI PERKINS  [address] [place] [place] [address]    Dear  Yuni,    Welcome to [facility] [place] Care Plus (MSC+) health program. My name is GENEVIEVE Beck. I am your MSC+ care coordinator. You are eligible for Care Coordination through [facility]+ Product.    Here is how Care Coordination works:    I will meet with you in person to determine your care coordination needs    We will develop a plan of care to meet your needs    We will create a service plan showing the services you will receive    We will talk about and coordinate any preventive care needs you have    I will call you soon to see how you are doing and determine what needs you may have. Our goal is to keep you as healthy and independent as possible.     Soon, you will receive a new [facility] member identification (ID) card from [facility]. When you receive it, please use this card along with your [place] Health Care Programs card and Prescription Drug Coverage Program card. When you receive, it please use this card where you get your health services. If you have Medicare, you will need to show your Medicare card when you get health services.    Being in the [place] Plus (MSC+) Care Coordination program is voluntary and offered to you at no cost. If you ever wish to stop being in the Care Coordination program or have questions, call me at [phone]. If you reach my voice mail, leave a message and your phone number. If you are hearing impaired, please call the [place] Relay at [phone] or [phone] (fjsggw-jx-blkwns relay service).    Sincerely,      GENEVIEVE Beck    E-mail: [email or web address]  Phone: [phone]      [employer]      [id number] accepted   (12/2019)

## 2021-10-07 NOTE — LETTER
October 21, 2021    YUNI PERKINS  [address] [place] [place] [address]    Dear Yuni:     Your Care Coordinator has been unable to reach you by telephone.I am writing to ask you or an authorized representative to call me at [phone]. If you reach my voicemail, please leave a message with your daytime telephone number and a date and time that I can call you. If you are hearing impaired, please call the [place] Relay at 574 or [phone] (jdukui-jf-gsnhps relay service).     The reason I am trying to reach you is:     [] Six (6) month check-in  [] To schedule your annual assessment  [x] Other: To schedule your initial assessment    Please call me as soon as you receive this letter. I look forward to speaking with you.    Sincerely,    GENEVIEVE Beck    E-mail: [email or web address]  Phone: [phone]      [facility]+ [id number] DHS Approved([id number])    [id number] (2/19)

## 2021-10-07 NOTE — PROGRESS NOTES
[facility] Care Coordination Contact    Member became effective with  Mike on 10/1/2021 with [employer] MSC+.  Previous Health Plan: MA/Fee For Service  Previous Care System: [facility]  Previous care coordinators name and number: NA, no MMIS  Waiver Type: N/A  Last MMIS Entry: Date NA and Type NA  MMIS visit date (and type) if different from above: NA  Services Listed in MMIS: NA  UTF received: No: Requested on No UTF to request as no MMIS or sevices.     Candace Zhong  Care Management Specialist  [facility]  [phone]

## 2021-10-18 NOTE — PROGRESS NOTES
10/18/21  L/M for Lumang using phone . Asked for a call back.    GENEVIEVE Beck  [facility]   [phone] (Cell)  [phone] (Office)  [phone] (fax)  [email or web address]

## 2021-10-20 NOTE — PROGRESS NOTES
10/20/21  PC to Mulu using phone . Phone not accepting VM.    Requested CMS to mail unable to reach letter.    GENEVIEVE Beck  [facility]   [phone] (Cell)  [phone] (Office)  [phone] (fax)  [email or web address]

## 2021-10-21 NOTE — PROGRESS NOTES
"[facility] Care Coordination Contact    Per CC, mailed client an \"Unable to Contact\" letter.    Candace Zhong  Care Management Specialist  [facility]  [phone]    "

## 2021-10-26 NOTE — PROGRESS NOTES
10/26/21  L/M for Mulu, [phone], using phone . Asked for a call back.   PC to home number [phone]. # out of service.    GENEVIEVE Beck  [facility]   [phone] (Cell)  [phone] (Office)  [phone] (fax)  [email or web address]

## 2021-10-29 NOTE — PROGRESS NOTES
Completed 4 attempts to reach client with no response.  Member is officially unable to contact effective 10/26/2021.  Completed MMIS entry.  Completed health plan required [facility] POC.    Follow-up Plan: CC will attempt to reach member in six months.    GENEVIEVE Beck  [facility]   [phone] (Cell)  [phone] (Office)  [phone] (fax)  [email or web address]

## 2022-05-17 ENCOUNTER — PATIENT OUTREACH (OUTPATIENT)
Dept: GERIATRIC MEDICINE | Facility: CLINIC | Age: 67
End: 2022-05-17
Payer: MEDICARE

## 2022-05-17 NOTE — LETTER
Sandhya 3, 2022    YUNI PERKINS  [address] [place] [place] [address]    Dear Yuni:     Your Care Coordinator has been unable to reach you by telephone.I am writing to ask you or an authorized representative to call me at [phone]. If you reach my voicemail, please leave a message with your daytime telephone number and a date and time that I can call you. If you are hearing impaired, please call the [place] Relay at [phone] or [phone] (jjyeev-pv-qwwogt relay service).     The reason I am trying to reach you is:     [x] Six (6) month check-in  [] To schedule your annual assessment  [] Other:      Please call me as soon as you receive this letter. I look forward to speaking with you.    Sincerely,    GENEVIEVE Beck    E-mail: [email or web address]  Phone: [phone]      [facility]+ [id number] DHS Approved([id number])    [id number] (2/19)

## 2022-05-17 NOTE — PROGRESS NOTES
5/17/22  PC to Mulu using phone . No answer and no voicemail. Will try again later.    GENEVIEVE Beck  [employer] [facility]   [phone] (Cell)  [phone] (Office)  [phone] (fax)  [email or web address]

## 2022-06-02 NOTE — PROGRESS NOTES
6/2/22  PC to Mulu using phone . No answer and no voicemail.    GENEVIEVE Beck  [facility]   [phone] (Cell)  [phone] (Office)  [phone] (fax)  [email or web address]

## 2022-06-03 NOTE — PROGRESS NOTES
"[facility] Care Coordination Contact    Per CC, mailed client an \"Unable to Contact\" letter for 6 month assessment.    Candace Zhong  Care Management Specialist  [facility]  [phone]    "

## 2022-06-10 DIAGNOSIS — I10 BENIGN ESSENTIAL HYPERTENSION: ICD-10-CM

## 2022-06-13 NOTE — TELEPHONE ENCOUNTER
Routing refill request to provider for review/approval because:  Labs not current:    Name from pharmacy: ATENOLOL-CHLORTHALIDONE 50-25 TABS          Will file in chart as: atenolol-chlorthalidone (TENORETIC) 50-25 MG tablet    Sig: Take 1 tablet by mouth every morning    Disp:  90 tablet    Refills:  3    Start: 6/10/2022    Class: E-Prescribe    Non-formulary For: Benign essential hypertension    Last ordered: 1 year ago by Bay Cyr MD Last refill: 9/3/2021    Rx #: [id number]    Beta-Blockers Protocol Failed 06/10/2022 11:29 AM   Protocol Details  Recent (12 mo) or future (30 days) visit within the authorizing provider's specialty    Blood pressure under 140/90 in past 12 months    Patient is age 6 or older    Medication is active on med list   Diuretics (Including Combos) Protocol Failed   Protocol Details  Recent (12 mo) or future (30 days) visit within the authorizing provider's specialty    Normal serum creatinine on file in past 12 months    Normal serum potassium on file in past 12 months    Normal serum sodium on file in past 12 months          Thania Pineda RN

## 2022-06-14 RX ORDER — ATENOLOL AND CHLORTHALIDONE TABLET 50; 25 MG/1; MG/1
1 TABLET ORAL EVERY MORNING
Qty: 90 TABLET | Refills: 0 | Status: SHIPPED | OUTPATIENT
Start: 2022-06-14 | End: 2022-08-23

## 2022-06-16 ENCOUNTER — PATIENT OUTREACH (OUTPATIENT)
Dept: GERIATRIC MEDICINE | Facility: CLINIC | Age: 67
End: 2022-06-16
Payer: COMMERCIAL

## 2022-06-16 NOTE — LETTER
June 17, 2022    YUNI PERKINS  [address] [place] [place] [address]        Dear Yuni:    As a member of [facility]'s MSC[facility] program, we offer a health risk assessment at no cost to you. I know you don't want to have the assessment right now. If you change your mind, please call me at the number below.    Who performs the health risk assessment?  A [facility] Care Coordinator performs the assessment. Our Care Coordinators can also help you understand your benefits. They can tell you about services to aid you at home, such as managing your care with your doctors if your health worsens.    Our Care Coordinators are here for you if you need:    Support for activities you used to do by yourself (including making meals, bathing and paying bills)    Equipment for bathroom or home safety    Help finding a new place to live    Information on staying healthy, preventing falls and immunizations    Questions?  If you have questions, or you would like to do he assessment, call me at [phone]. TTY users call [phone]. I'm here from 8am to 5pm. I may reach out to you again soon.       Sincerely,         GENEVIEVE Beck    E-mail: [email or web address]  Phone: [phone]      [employer]        \<[id number] accepted  [id number]>    [id number] (21/2021)

## 2022-06-16 NOTE — PROGRESS NOTES
[facility] Care Coordination Contact      [facility] Refusal Telephone Assessment    Spoke with member for the first time today. Previous unable to reach. Offered home visit but member refused home visit [facility] on 6/16/22 (reason: independent, not needed/wanted).    ER visits: No  Hospitalizations: No  Health concerns: none, independent  Falls/Injuries: No  ADL/IADL Dependencies: reports none, independent        Member currently receiving the following home care services:   none  Member currently receiving the following community resources:  none  Informal support(s):  , daughter    Advanced Care Planning discussion, complete code section.    [facility] sponsored benefits: Shared information re: gym memberships, ASA, Calcium +D.    Follow-Up Plan: Member informed of future contact, plan to f/u with member with a 6 month telephone assessment and offer a home visit.  Contact information shared with member and family, encouraged member to call with any questions or concerns at any time.    This CC note routed to PCP.    GENEVIEVE Beck  [facility]   [phone] (Cell)  [phone] (Office)  [phone] (fax)  [email or web address]

## 2022-06-16 NOTE — PROGRESS NOTES
[facility] Six-Month Telephone Assessment    6 month telephone assessment completed on 6/16/22.    ER visits: No  Hospitalizations: No  TCU stays: No  Significant health status changes: no  Falls/Injuries: No  ADL/IADL changes: No  Changes in services: No    Caregiver Assessment follow up:  n/a    Goals: See POC in chart for goal progress documentation.  n/a    Will see member in 6 months for an annual health risk assessment.   Encouraged member to call CC with any questions or concerns in the meantime.     GENEVIEVE Beck  [facility]   [phone] (Cell)  [phone] (Office)  [phone] (fax)  [email or web address]

## 2022-06-17 NOTE — PROGRESS NOTES
"[facility] Care Coordination Contact    Per CC, mailed client a \"Refusal of Home Visit\" letter.    Candace Zhong  Care Management Specialist  [facility]  [phone]      "

## 2022-07-12 NOTE — PROGRESS NOTES
ASSESSMENT & PLAN  Patient Instructions     1. Acute pain of right knee    2. Chondromalacia of left patella    3. Chondromalacia of right patella      -Patient has bilateral knee pain due to inflammation of the cartilage  -Patient will start meloxicam 15 mg daily for the next 2 to 3 weeks and then on as-needed basis as pain improves  -Patient was started hospitalized program.  Handouts were given today  -Patient will follow up if pain does not improve.  To consider potential cortisone injection if appropriate  -Call direct clinic number [[phone]] at any time with questions or concerns.    Albert Yeo MD [facility]  [facility]          -----    SUBJECTIVE  Mulu Man is a/an 66 year old female who is seen as a self referral for evaluation of bilateral knee pain, right worse than left. The patient is seen with their .  Use of  over the Ipad today.     Onset: 3 week(s) ago. Reports insidious onset without acute precipitating event.  Location of Pain: bilateral knee, right medial knee, non radiating   Rating of Pain at worst: 10/10  Rating of Pain Currently: 5/10  Worsened by: walking for extended periods of time, stairs   Better with: Tylenol, ice  Treatments tried: Tylenol, ice, compression knee brace  Associated symptoms: feeling of instability, locking of right knee, denies stiffness or swelling  Orthopedic history: YES - Date: 20-30 years go symptomatic locking of knee, took medication rested  Relevant surgical history: YES - Date: spinal surgery Left L4-L5 microdiscectomy at [facility] 2009  Social history: social history: retired    Past Medical History:   Diagnosis Date     Allergy      Benign essential hypertension 2018     Hypothyroidism due to Hashimoto's thyroiditis 06/02/2012     Social History     Socioeconomic History     Marital status:    Tobacco Use     Smoking status: Never Smoker     Smokeless tobacco: Never Used  "  Substance and Sexual Activity     Alcohol use: No     Drug use: No     Sexual activity: Yes     Partners: Male         Patient's past medical, surgical, social, and family histories were reviewed today and no changes are noted.    REVIEW OF SYSTEMS:  10 point ROS is negative other than symptoms noted above in HPI, Past Medical History or as stated below  Constitutional: NEGATIVE for fever, chills, change in weight  Skin: NEGATIVE for worrisome rashes, moles or lesions  GI/: NEGATIVE for bowel or bladder changes  Neuro: NEGATIVE for weakness, dizziness or paresthesias    OBJECTIVE:  /84   Ht 1.594 m (5' 2.75\")   Wt 69.7 kg (153 lb 9.6 oz)   BMI 27.43 kg/m     General: healthy, alert and in no distress  HEENT: no scleral icterus or conjunctival erythema  Skin: no suspicious lesions or rash. No jaundice.  CV: no pedal edema  Resp: normal respiratory effort without conversational dyspnea   Psych: normal mood and affect  Gait: normal steady gait with appropriate coordination and balance  Neuro: Normal light sensory exam of lower extremity  MSK:  BILATERAL KNEE  Inspection:    normal alignment  Palpation:    Tender about the medial patellar facet and medial joint line. Remainder of bony and ligamentous landmarks are nontender.    No effusion is present    Patellofemoral crepitus is Absent  Range of Motion:     00 extension to 1200 flexion  Strength:    Quadriceps grossly intact    Extensor mechanism intact  Special Tests:    Positive: none    Negative: MCL/valgus stress (0 & 30 deg), LCL/varus stress (0 & 30 deg), Lachman's, anterior drawer, posterior drawer, Lars's    Independent visualization of the below image:  Recent Results (from the past 24 hour(s))   XR Knee Bilateral 3 Views    Narrative    No acute fracture, dislocation or osseous abnormalities.           Albert Yeo MD [facility] Sports and Orthopedic Care    "

## 2022-07-19 ENCOUNTER — OFFICE VISIT (OUTPATIENT)
Dept: ORTHOPEDICS | Facility: CLINIC | Age: 67
End: 2022-07-19
Payer: MEDICARE

## 2022-07-19 VITALS
SYSTOLIC BLOOD PRESSURE: 102 MMHG | HEIGHT: 63 IN | WEIGHT: 153.6 LBS | DIASTOLIC BLOOD PRESSURE: 84 MMHG | BODY MASS INDEX: 27.21 KG/M2

## 2022-07-19 DIAGNOSIS — M25.561 ACUTE PAIN OF RIGHT KNEE: Primary | ICD-10-CM

## 2022-07-19 DIAGNOSIS — M22.42 CHONDROMALACIA OF LEFT PATELLA: ICD-10-CM

## 2022-07-19 DIAGNOSIS — M22.41 CHONDROMALACIA OF RIGHT PATELLA: ICD-10-CM

## 2022-07-19 PROCEDURE — 99204 OFFICE O/P NEW MOD 45 MIN: CPT | Performed by: FAMILY MEDICINE

## 2022-07-19 RX ORDER — MELOXICAM 15 MG/1
15 TABLET ORAL DAILY
Qty: 30 TABLET | Refills: 1 | Status: SHIPPED | OUTPATIENT
Start: 2022-07-19 | End: 2022-08-23

## 2022-07-19 NOTE — LETTER
7/19/2022         RE: Mulu Knowles  [address] [address]        Dear Colleague,    Thank you for referring your patient, Mulu Knowles, to the [facility] [place]. Please see a copy of my visit note below.    ASSESSMENT & PLAN  Patient Instructions     1. Acute pain of right knee    2. Chondromalacia of left patella    3. Chondromalacia of right patella      -Patient has bilateral knee pain due to inflammation of the cartilage  -Patient will start meloxicam 15 mg daily for the next 2 to 3 weeks and then on as-needed basis as pain improves  -Patient was started hospitalized program.  Handouts were given today  -Patient will follow up if pain does not improve.  To consider potential cortisone injection if appropriate  -Call direct clinic number [[phone]] at any time with questions or concerns.    Albert Yeo MD [facility] Orthopedics and Sports Medicine  [facility] [place]          -----    SUBJECTIVE  Mulu Knowles is a/an 66 year old female who is seen as a self referral for evaluation of bilateral knee pain, right worse than left. The patient is seen with their .  Use of  over the Ipad today.     Onset: 3 week(s) ago. Reports insidious onset without acute precipitating event.  Location of Pain: bilateral knee, right medial knee, non radiating   Rating of Pain at worst: 10/10  Rating of Pain Currently: 5/10  Worsened by: walking for extended periods of time, stairs   Better with: Tylenol, ice  Treatments tried: Tylenol, ice, compression knee brace  Associated symptoms: feeling of instability, locking of right knee, denies stiffness or swelling  Orthopedic history: YES - Date: 20-30 years go symptomatic locking of knee, took medication rested  Relevant surgical history: YES - Date: spinal surgery Left L4-L5 microdiscectomy at [facility] 2009  Social history: social history: retired    Past Medical History:   Diagnosis Date     Allergy       "Benign essential hypertension 2018     Hypothyroidism due to Hashimoto's thyroiditis 06/02/2012     Social History     Socioeconomic History     Marital status:    Tobacco Use     Smoking status: Never Smoker     Smokeless tobacco: Never Used   Substance and Sexual Activity     Alcohol use: No     Drug use: No     Sexual activity: Yes     Partners: Male         Patient's past medical, surgical, social, and family histories were reviewed today and no changes are noted.    REVIEW OF SYSTEMS:  10 point ROS is negative other than symptoms noted above in HPI, Past Medical History or as stated below  Constitutional: NEGATIVE for fever, chills, change in weight  Skin: NEGATIVE for worrisome rashes, moles or lesions  GI/: NEGATIVE for bowel or bladder changes  Neuro: NEGATIVE for weakness, dizziness or paresthesias    OBJECTIVE:  /84   Ht 1.594 m (5' 2.75\")   Wt 69.7 kg (153 lb 9.6 oz)   BMI 27.43 kg/m     General: healthy, alert and in no distress  HEENT: no scleral icterus or conjunctival erythema  Skin: no suspicious lesions or rash. No jaundice.  CV: no pedal edema  Resp: normal respiratory effort without conversational dyspnea   Psych: normal mood and affect  Gait: normal steady gait with appropriate coordination and balance  Neuro: Normal light sensory exam of lower extremity  MSK:  BILATERAL KNEE  Inspection:    normal alignment  Palpation:    Tender about the medial patellar facet and medial joint line. Remainder of bony and ligamentous landmarks are nontender.    No effusion is present    Patellofemoral crepitus is Absent  Range of Motion:     00 extension to 1200 flexion  Strength:    Quadriceps grossly intact    Extensor mechanism intact  Special Tests:    Positive: none    Negative: MCL/valgus stress (0 & 30 deg), LCL/varus stress (0 & 30 deg), Lachman's, anterior drawer, posterior drawer, Lars's    Independent visualization of the below image:  Recent Results (from the past 24 hour(s)) "   XR Knee Bilateral 3 Views    Narrative    No acute fracture, dislocation or osseous abnormalities.           Albert Yeo MD [facility] and Orthopedic Care        Again, thank you for allowing me to participate in the care of your patient.        Sincerely,        Albert Yeo, MD

## 2022-07-19 NOTE — PATIENT INSTRUCTIONS
1. Acute pain of right knee    2. Chondromalacia of left patella    3. Chondromalacia of right patella      -Patient has bilateral knee pain due to inflammation of the cartilage  -Patient will start meloxicam 15 mg daily for the next 2 to 3 weeks and then on as-needed basis as pain improves  -Patient was started hospitalized program.  Handouts were given today  -Patient will follow up if pain does not improve.  To consider potential cortisone injection if appropriate  -Call direct clinic number [[phone]] at any time with questions or concerns.    Albert Yeo MD [facility]  [facility] [place]

## 2022-08-23 ENCOUNTER — OFFICE VISIT (OUTPATIENT)
Dept: INTERNAL MEDICINE | Facility: CLINIC | Age: 67
End: 2022-08-23
Payer: MEDICARE

## 2022-08-23 VITALS
OXYGEN SATURATION: 100 % | HEIGHT: 63 IN | TEMPERATURE: 97.7 F | SYSTOLIC BLOOD PRESSURE: 120 MMHG | HEART RATE: 64 BPM | DIASTOLIC BLOOD PRESSURE: 80 MMHG | WEIGHT: 154 LBS | BODY MASS INDEX: 27.29 KG/M2

## 2022-08-23 DIAGNOSIS — E78.5 HYPERLIPIDEMIA LDL GOAL <100: ICD-10-CM

## 2022-08-23 DIAGNOSIS — Z00.00 ENCOUNTER FOR MEDICARE ANNUAL WELLNESS EXAM: Primary | ICD-10-CM

## 2022-08-23 DIAGNOSIS — Z23 NEED FOR PNEUMOCOCCAL VACCINATION: ICD-10-CM

## 2022-08-23 DIAGNOSIS — Z12.31 VISIT FOR SCREENING MAMMOGRAM: ICD-10-CM

## 2022-08-23 DIAGNOSIS — Z13.6 CARDIOVASCULAR SCREENING; LDL GOAL LESS THAN 160: ICD-10-CM

## 2022-08-23 DIAGNOSIS — R73.03 PREDIABETES: ICD-10-CM

## 2022-08-23 DIAGNOSIS — Z11.4 SCREENING FOR HIV (HUMAN IMMUNODEFICIENCY VIRUS): ICD-10-CM

## 2022-08-23 DIAGNOSIS — Z12.11 SCREEN FOR COLON CANCER: ICD-10-CM

## 2022-08-23 DIAGNOSIS — E06.3 HYPOTHYROIDISM DUE TO HASHIMOTO'S THYROIDITIS: ICD-10-CM

## 2022-08-23 DIAGNOSIS — I10 BENIGN ESSENTIAL HYPERTENSION: ICD-10-CM

## 2022-08-23 LAB
ANION GAP SERPL CALCULATED.3IONS-SCNC: 6 MMOL/L (ref 3–14)
BASOPHILS # BLD AUTO: 0 10E3/UL (ref 0–0.2)
BASOPHILS NFR BLD AUTO: 1 %
BUN SERPL-MCNC: 25 MG/DL (ref 7–30)
CALCIUM SERPL-MCNC: 9.4 MG/DL (ref 8.5–10.1)
CHLORIDE BLD-SCNC: 106 MMOL/L (ref 94–109)
CHOLEST SERPL-MCNC: 174 MG/DL
CO2 SERPL-SCNC: 27 MMOL/L (ref 20–32)
CREAT SERPL-MCNC: 0.84 MG/DL (ref 0.52–1.04)
EOSINOPHIL # BLD AUTO: 0.3 10E3/UL (ref 0–0.7)
EOSINOPHIL NFR BLD AUTO: 5 %
ERYTHROCYTE [DISTWIDTH] IN BLOOD BY AUTOMATED COUNT: 11.7 % (ref 10–15)
FASTING STATUS PATIENT QL REPORTED: YES
GFR SERPL CREATININE-BSD FRML MDRD: 77 ML/MIN/1.73M2
GLUCOSE BLD-MCNC: 112 MG/DL (ref 70–99)
HBA1C MFR BLD: 6.4 % (ref 0–5.6)
HCT VFR BLD AUTO: 42.9 % (ref 35–47)
HDLC SERPL-MCNC: 38 MG/DL
HGB BLD-MCNC: 13.6 G/DL (ref 11.7–15.7)
IMM GRANULOCYTES # BLD: 0 10E3/UL
IMM GRANULOCYTES NFR BLD: 0 %
LDLC SERPL CALC-MCNC: 100 MG/DL
LYMPHOCYTES # BLD AUTO: 3.1 10E3/UL (ref 0.8–5.3)
LYMPHOCYTES NFR BLD AUTO: 50 %
MCH RBC QN AUTO: 29.8 PG (ref 26.5–33)
MCHC RBC AUTO-ENTMCNC: 31.7 G/DL (ref 31.5–36.5)
MCV RBC AUTO: 94 FL (ref 78–100)
MONOCYTES # BLD AUTO: 0.4 10E3/UL (ref 0–1.3)
MONOCYTES NFR BLD AUTO: 7 %
NEUTROPHILS # BLD AUTO: 2.4 10E3/UL (ref 1.6–8.3)
NEUTROPHILS NFR BLD AUTO: 37 %
NONHDLC SERPL-MCNC: 136 MG/DL
NRBC # BLD AUTO: 0 10E3/UL
NRBC BLD AUTO-RTO: 0 /100
PLATELET # BLD AUTO: 190 10E3/UL (ref 150–450)
POTASSIUM BLD-SCNC: 3.9 MMOL/L (ref 3.4–5.3)
RBC # BLD AUTO: 4.57 10E6/UL (ref 3.8–5.2)
SODIUM SERPL-SCNC: 139 MMOL/L (ref 133–144)
TRIGL SERPL-MCNC: 182 MG/DL
WBC # BLD AUTO: 6.3 10E3/UL (ref 4–11)

## 2022-08-23 PROCEDURE — 99397 PER PM REEVAL EST PAT 65+ YR: CPT | Mod: 25 | Performed by: INTERNAL MEDICINE

## 2022-08-23 PROCEDURE — 36415 COLL VENOUS BLD VENIPUNCTURE: CPT | Performed by: INTERNAL MEDICINE

## 2022-08-23 PROCEDURE — 80061 LIPID PANEL: CPT | Performed by: INTERNAL MEDICINE

## 2022-08-23 PROCEDURE — 80048 BASIC METABOLIC PNL TOTAL CA: CPT | Performed by: INTERNAL MEDICINE

## 2022-08-23 PROCEDURE — 99214 OFFICE O/P EST MOD 30 MIN: CPT | Mod: 25 | Performed by: INTERNAL MEDICINE

## 2022-08-23 PROCEDURE — 83036 HEMOGLOBIN GLYCOSYLATED A1C: CPT | Performed by: INTERNAL MEDICINE

## 2022-08-23 PROCEDURE — 90471 IMMUNIZATION ADMIN: CPT | Performed by: INTERNAL MEDICINE

## 2022-08-23 PROCEDURE — 85025 COMPLETE CBC W/AUTO DIFF WBC: CPT | Performed by: INTERNAL MEDICINE

## 2022-08-23 PROCEDURE — 90677 PCV20 VACCINE IM: CPT | Performed by: INTERNAL MEDICINE

## 2022-08-23 PROCEDURE — 87389 HIV-1 AG W/HIV-1&-2 AB AG IA: CPT | Performed by: INTERNAL MEDICINE

## 2022-08-23 RX ORDER — ATENOLOL AND CHLORTHALIDONE TABLET 50; 25 MG/1; MG/1
1 TABLET ORAL EVERY MORNING
Qty: 90 TABLET | Refills: 3 | Status: SHIPPED | OUTPATIENT
Start: 2022-08-23 | End: 2022-09-21

## 2022-08-23 RX ORDER — LEVOTHYROXINE SODIUM 88 UG/1
88 TABLET ORAL DAILY
Status: CANCELLED | OUTPATIENT
Start: 2022-08-23

## 2022-08-23 ASSESSMENT — ENCOUNTER SYMPTOMS
HEMATOCHEZIA: 0
DIZZINESS: 0
HEARTBURN: 0
DIARRHEA: 0
PARESTHESIAS: 0
ABDOMINAL PAIN: 0
FREQUENCY: 0
NAUSEA: 0
MYALGIAS: 0
COUGH: 0
HEMATURIA: 0
HEADACHES: 0
EYE PAIN: 0
NERVOUS/ANXIOUS: 0
JOINT SWELLING: 0
SORE THROAT: 0
SHORTNESS OF BREATH: 0
CHILLS: 0
WEAKNESS: 0
CONSTIPATION: 0
FEVER: 0
PALPITATIONS: 0
DYSURIA: 0
ARTHRALGIAS: 0

## 2022-08-23 ASSESSMENT — ACTIVITIES OF DAILY LIVING (ADL): CURRENT_FUNCTION: NO ASSISTANCE NEEDED

## 2022-08-23 NOTE — PROGRESS NOTES
"SUBJECTIVE:   Mulu Knowles is a 66 year old female who presents for Preventive Visit.      Patient has been advised of split billing requirements and indicates understanding: Yes  Are you in the first 12 months of your Medicare coverage?  Yes-vision not done-language barrier    Healthy Habits:     In general, how would you rate your overall health?  Fair    Frequency of exercise:  None    Duration of exercise:  Other    Do you usually eat at least 4 servings of fruit and vegetables a day, include whole grains    & fiber and avoid regularly eating high fat or \"junk\" foods?  Yes    Taking medications regularly:  No    Barriers to taking medications:  Side effects    Medication side effects:  Other    Ability to successfully perform activities of daily living:  No assistance needed    Home Safety:  No safety concerns identified    Hearing Impairment:  No hearing concerns    In the past 6 months, have you been bothered by leaking of urine? Yes    In general, how would you rate your overall mental or emotional health?  Fair      PHQ-2 Total Score: 0    Additional concerns today:  No       Do you feel safe in your environment? Yes    Have you ever done Advance Care Planning? (For example, a Health Directive, POLST, or a discussion with a medical provider or your loved ones about your wishes): No, advance care planning information given to patient to review.  Patient plans to discuss their wishes with loved ones or provider.         Fall risk  Fallen 2 or more times in the past year?: No  Any fall with injury in the past year?: No    Cognitive Screening   1) Repeat 3 items (Leader, Season, Table)  language barrier-unable to repeat to  ( she could understand patient)  2) Clock draw: abnormal- language barrier  3) Results: inconclusive-language barrier    Mini-CogTM Copyright [employer]. Licensed by the author for use in [facility]; reprinted with permission ([email or web address]). All rights reserved.      Do " "you have sleep apnea, excessive snoring or daytime drowsiness?: no  Has nightmares-no abuse issues per patient    Reviewed and updated as needed this visit by clinical staff   Tobacco  Allergies  Meds  Problems               Reviewed and updated as needed this visit by Provider    Allergies  Meds  Problems              Social History     Tobacco Use     Smoking status: Never Smoker     Smokeless tobacco: Never Used   Substance Use Topics     Alcohol use: No     If you drink alcohol do you typically have >3 drinks per day or >7 drinks per week? Not applicable    Alcohol Use 8/23/2022   Prescreen: >3 drinks/day or >7 drinks/week? No   Prescreen: >3 drinks/day or >7 drinks/week? -       1.    Hypertension:  History of hypertension, on medication.  No reported side effects from medications.    Reviewed last 6 BP readings in chart:  BP Readings from Last 6 Encounters:   08/23/22 120/80   07/19/22 102/84   09/21/21 102/70   03/23/21 110/76   02/22/21 138/80   01/10/19 (!) 144/94     No active cardiac complaints or symptoms with exercise.     2.  history of hypothyroidism, followed by [facility], just seen there in May, told labs were \"normal\", no medication changes.             Current providers sharing in care for this patient include:   Patient Care Team:  Sera Esquivel MD as PCP - General (Internal Medicine)  Remington Galaviz Christofer Adams, MD as Assigned PCP  Lainey Su LSW as Lead Care Coordinator (Primary Care - CC)  Yeo, Albert, MD as Assigned Musculoskeletal Provider    The following health maintenance items are reviewed in Epic and correct as of today:  Health Maintenance Due   Topic Date Due     DEXA  Never done     HIV SCREENING  Never done     ZOSTER IMMUNIZATION (1 of 2) Never done     COLORECTAL CANCER SCREENING  07/14/2013     COVID-19 Vaccine (3 - Booster for Pfizer series) 09/20/2021         **I reviewed the information recorded in the patient's EPIC " "chart (including but not limited to medical history, surgical history, family history, problem list, medication list, and allergy list) and updated the information as indicated based on the patients reported information.         FHS-7:   Breast CA Risk Assessment (FHS-7) 8/23/2022   Did any of your first-degree relatives have breast or ovarian cancer? No   Did any of your relatives have bilateral breast cancer? No   Did any man in your family have breast cancer? No   Did any woman in your family have breast and ovarian cancer? No   Did any woman in your family have breast cancer before age 50 y? No   Do you have 2 or more relatives with breast and/or ovarian cancer? No   Do you have 2 or more relatives with breast and/or bowel cancer? No       Mammogram Screening: Recommended mammography every 1-2 years with patient discussion and risk factor consideration  Pertinent mammograms are reviewed under the imaging tab.    Review of Systems   Constitutional: Negative for chills and fever.   HENT: Negative for congestion, ear pain, hearing loss and sore throat.    Eyes: Negative for pain and visual disturbance.   Respiratory: Negative for cough and shortness of breath.    Cardiovascular: Negative for chest pain, palpitations and peripheral edema.   Gastrointestinal: Negative for abdominal pain, constipation, diarrhea, heartburn, hematochezia and nausea.   Genitourinary: Negative for dysuria, frequency, genital sores, hematuria and urgency.   Musculoskeletal: Negative for arthralgias, joint swelling and myalgias.   Skin: Negative for rash.   Neurological: Negative for dizziness, weakness, headaches and paresthesias.   Psychiatric/Behavioral: Negative for mood changes. The patient is not nervous/anxious.      Constitutional, HEENT, cardiovascular, pulmonary, gi and gu systems are negative, except as otherwise noted.    OBJECTIVE:   /80   Pulse 64   Temp 97.7  F (36.5  C) (Tympanic)   Ht 1.594 m (5' 2.75\")   Wt 69.9 " "kg (154 lb)   LMP  (LMP Unknown)   SpO2 100%   Breastfeeding No   BMI 27.50 kg/m   Estimated body mass index is 27.5 kg/m  as calculated from the following:    Height as of this encounter: 1.594 m (5' 2.75\").    Weight as of this encounter: 69.9 kg (154 lb).  Physical Exam    GENERAL alert and no distress  EYES:  Normal sclera,conjunctiva, EOMI  HENT: oral and posterior pharynx without lesions or erythema, facies symmetric  NECK: Neck supple. No LAD, without thyroidmegaly.  RESP: Clear to ausculation bilaterally without wheezes or crackles. Normal BS in all fields.  CV: RRR normal S1S2 without murmurs, rubs or gallops.  LYMPH: no cervical lymph adenopathy appreciated  MS: extremities- no gross deformities of the visible extremities noted,   EXT:  no lower extremity edema  PSYCH: Alert and oriented times 3; speech- coherent  SKIN:  No obvious significant skin lesions on visible portions of face     Diagnostic Test Results:  Labs reviewed in Epic    ASSESSMENT / PLAN:     (Z00.00) Encounter for Medicare annual wellness exam  (primary encounter diagnosis)  Comment: Discussed cardiac disease risk factors and cardiac disease risk factor modification, including diabetes screening, blood pressure screening (and management if indicated), and cholesterol screening.   Reviewed immunzation guidelines, including pneumococcal vaccines, annual influenza, and shingles vaccines.   Discussed routine cancer screenings, including skin cancer, colon cancer screening for everyone until age 80, prostate cancer screening in men until age 75, mammogram and PAP/pelvic for women until age 75.   Recommended regular dentist visits to care for remaining teeth.   Recommended regular screening for vision and glaucoma.   Recommended safe driving and accident avoidance.   Plan: REVIEW OF HEALTH MAINTENANCE PROTOCOL ORDERS            (I10) Benign essential hypertension  Comment: This condition is currently controlled on the current medical " "regimen.  Continue current therapy.   Plan: REVIEW OF HEALTH MAINTENANCE PROTOCOL ORDERS,         atenolol-chlorthalidone (TENORETIC) 50-25 MG         tablet, CBC with platelets and differential,         Basic metabolic panel            (R73.03) Prediabetes  Comment: Reviewed the labs showing mildly elevated glucose levels consistent with prediabetes.     Discussed \"pre-diabetes\", impaired glucose tolerance, and its part in the dysmetabolic syndrome.   No medications needed at this time.     Discussed the inevitable progression of impaired glucose tolerance toward worsening diabetes mellitus if nothing is changed in the diet, and the need for agressive interventions now to delay and prevent this inevitable progression.    Recommended lower carb diet.  Recommended regular physical activity.   We will continue to monitor this and ramirez additional recommendations and treatments as indicated based on the labs.       Plan: REVIEW OF HEALTH MAINTENANCE PROTOCOL ORDERS,         Basic metabolic panel, Hemoglobin A1c            (Z12.11) Screen for colon cancer  Comment: I discussed current recommendations for routine colon cancer screening starting at age 45 (age 35 for family history of colon cancer).  Recommended colonoscopy as the gold standard test for colon cancer screening, but the patient is declining to do colonoscopy at this time.   Discussed FIT and ColoGuard stool tests as suitable options for routine colon cancer screening.   After discussion, they decided to perform the FIT test.    If result Negative, repeat FIT test annually or ColoGuard testing every 3 years (or eventually consider colonoscopy)  If result Positive, does not necessarily mean colon cancer, but means they need colonoscopy.   Plan: REVIEW OF HEALTH MAINTENANCE PROTOCOL ORDERS,         Fecal colorectal cancer screen FIT - Future         (S+30)            (E03.8,  E06.3) Hypothyroidism due to Hashimoto's thyroiditis  Comment: recheck labs, titrate " "dose.   Plan: REVIEW OF HEALTH MAINTENANCE PROTOCOL ORDERS            (Z23) Need for pneumococcal vaccination  Comment:   Plan: REVIEW OF HEALTH MAINTENANCE PROTOCOL ORDERS,         Pneumococcal 20 Valent Conjugate (PCV20)            (Z12.31) Visit for screening mammogram  Comment:   Plan: REVIEW OF HEALTH MAINTENANCE PROTOCOL ORDERS,         MA SCREENING DIGITAL BILAT - Future  (s+30)            (Z11.4) Screening for HIV (human immunodeficiency virus)  Comment: One time screening test per CDC recommendations.   Plan: REVIEW OF HEALTH MAINTENANCE PROTOCOL ORDERS,         HIV Antigen Antibody Combo            (Z13.6) CARDIOVASCULAR SCREENING; LDL GOAL LESS THAN 160  Comment: Discussed cardiac disease risk factors and cardiac disease risk factor modification.   Plan: REVIEW OF HEALTH MAINTENANCE PROTOCOL ORDERS,         CBC with platelets and differential, Basic         metabolic panel, Hemoglobin A1c               Patient has been advised of split billing requirements and indicates understanding: Yes    COUNSELING:  Reviewed preventive health counseling, as reflected in patient instructions       Regular exercise       Healthy diet/nutrition       Vision screening       Hearing screening       Dental care       Bladder control       Fall risk prevention       Immunizations    Vaccinated for: Pneumococcal             Colon cancer screening    Estimated body mass index is 27.5 kg/m  as calculated from the following:    Height as of this encounter: 1.594 m (5' 2.75\").    Weight as of this encounter: 69.9 kg (154 lb).        She reports that she has never smoked. She has never used smokeless tobacco.      Appropriate preventive services were discussed with this patient, including applicable screening as appropriate for cardiovascular disease, diabetes, osteopenia/osteoporosis, and glaucoma.  As appropriate for age/gender, discussed screening for colorectal cancer, prostate cancer, breast cancer, and cervical cancer. " Checklist reviewing preventive services available has been given to the patient.    Reviewed patients plan of care and provided an AVS. The  for [place] meets the Care Plan requirement. This Care Plan has been established and reviewed with the .    Counseling Resources:  ATP IV Guidelines  Pooled Cohorts Equation Calculator  Breast Cancer Risk Calculator  Breast Cancer: Medication to Reduce Risk  FRAX Risk Assessment  ICSI Preventive Guidelines  Dietary Guidelines for Americans, 2010  USDA's MyPlate  ASA Prophylaxis  Lung CA Screening    Bay Cyr MD  [facility]    Identified Health Risks:

## 2022-08-23 NOTE — PROGRESS NOTES
"    The patient was provided with suggestions to help her develop a healthy physical lifestyle.  She is at risk for lack of exercise and has been provided with information to increase physical activity for the benefit of her well-being.  Information on urinary incontinence and treatment options given to patient.  The patient was provided with suggestions to help her develop a healthy emotional lifestyle.          Answers for HPI/ROS submitted by the patient on 8/23/2022  In general, how would you rate your overall physical health?: fair  Frequency of exercise:: None  Do you usually eat at least 4 servings of fruit and vegetables a day, include whole grains & fiber, and avoid regularly eating high fat or \"junk\" foods? : Yes  Taking medications regularly:: No  Medication side effects:: Other  Activities of Daily Living: no assistance needed  Home safety: no safety concerns identified  Hearing Impairment:: no hearing concerns  In the past 6 months, have you been bothered by leaking of urine?: Yes  abdominal pain: No  Blood in stool: No  Blood in urine: No  chest pain: No  chills: No  congestion: No  constipation: No  cough: No  diarrhea: No  dizziness: No  ear pain: No  eye pain: No  nervous/anxious: No  fever: No  frequency: No  genital sores: No  headaches: No  hearing loss: No  heartburn: No  arthralgias: No  joint swelling: No  peripheral edema: No  mood changes: No  myalgias: No  nausea: No  dysuria: No  palpitations: No  Skin sensation changes: No  sore throat: No  urgency: No  rash: No  shortness of breath: No  visual disturbance: No  weakness: No  In general, how would you rate your overall mental or emotional health?: fair  Additional concerns today:: No  Barriers to taking medications:: Side effects      "

## 2022-08-23 NOTE — PATIENT INSTRUCTIONS
"   We are rechecking your labs.  I will let you know if the results indicate any changes in your therapy.  Unless you hear otherwise, continue all medications at the same doses.  Contact your usual pharmacy if you need refills.      Assuming the labs look good, continue the blood pressure medication (atenolol/chlorthalidone) at the same dose.      Mammogram today, then every 2 years.       Colon cancer screening with the FIT stool test.  Collect and return the stool sample as instructed.      Pneumonia vaccine given today.   This is given to everyone when they turn 65 years of age.      Get the annual influenza vaccine when it becomes available soon.      Get the updated Covid vaccine when it becomes available     Return to see me in 1 year, sooner if needed.  Use [employer] or Call [phone] to schedule the appointment with me.        The labs from last year showed a mild elevation in the blood glucose levels consistent with \"pre-diabetes\".  No medications needed at this time, but you do need to adjust your diet to help prevent this from getting worse.  Specifically reduce the intake of \"simple carbohydrates\" (e.g. White bread, white rice, pasta, noodles, potatoes, snack foods, regular soda, juices (except fresh squeezed), cakes, cookies, candy, etc.) as best possible.     We will continue to monitor these levels and if there is any sign of worsening, then we will need consider a medication.         Colon Cancer Screening:  ============================================================  *  All men and women are recommended to have some sort of formal colon cancer screening starting at age of 50 (or earlier if indicated based on family history of colon cancer.     *  Colon cancer is a preventable type of cancer that if caught early can be easily treated even before it becomes cancer by removing the precancerous.      *  Common Colon cancer screening options:     --Colonoscopy (every 10 years if normal exam, no family " "history of colon cancer)  I place order and you will be contacted to arrange this procedure.   Pros: most complete and thorough exam, identify and remove any pre-cancerous polyps.   Cons: prep before procedure, sedation required, possible cost     --ColoGuard Stool test every 3 years (be sure to confirm coverage by insurance).  This test checks for colon cancer specific DNA in the stool.  You will receive the collection kit in the mail.  Return the samples via mail.  If positive, you do not necessarily have colon cancer, but will need a colonoscopy.  Most insurance cover this test, but please check with your insurance to confirm this.    Go their web site for more information:  [email or web address]  Pros: easy to collect and return, decent specific screening test, newer test  Cons: cost (if not covered by insurance)     --\"FIT\" Stool test once per year.    Return the \"FIT\" stool test to us via mail.  This is a basic level screening for colon cancer by detecting trace amount of occult blood in the intestinal tract.  If the FIT test shows any traces of occult blood, it does NOT necessarily mean that you have colon cancer, it just means that we should probably consider doing the colonoscopy.   Pros: easy to collect, cheap  Cons: not very specific, high false positive rate       Return the \"FIT\" stool card test to us via mail.  Be sure to place the return mailing envelope into an indoor mail box, do not leave outside if cold, it can freeze and invalidate the results.    This is a basic level screening for colon cancer by detecting trace amount of occult blood in the intestinal tract.  My preference (and that of the [employer] Society) would normally be for a full colonoscopy, but the FIT test can suffice for now.  Eventually you should have a colonoscopy.    If the FIT test shows any traces of occult blood, it does NOT necessarily mean that you have colon cancer, it just means that we should probably " "consider doing the colonoscopy.        5 GOALS TO PREVENT VASCULAR DISEASE:     1.  Aggressive blood pressure control, under 130/80 ideally.  Using medications if needed.    Your blood pressure is under good control    BP Readings from Last 4 Encounters:   08/23/22 120/80   07/19/22 102/84   09/21/21 102/70   03/23/21 110/76       2.  Aggressive LDL cholesterol (\"bad cholesterol\") lowering as indicated.    Your goal is an LDL under 130 for sure, preferably under 100.  (If you have diabetes or previous vascular disease, the the LDL goals would be under 100 for sure, preferably under 70.)    New guidelines identify four high-risk groups who could benefit from statins:   *people with pre-existing heart disease, such as those who have had a heart attack;   *people ages 40 to 75 who have diabetes of any type  *patients ages 40 to 75 with at least a 7.5% risk of developing cardiovascular disease over the next decade, according to a formula described in the guidelines  *patients with the sort of super-high cholesterol that sometimes runs in families, as evidenced by an LDL of 190 milligrams per deciliter or higher    Your cholesterol levels are well controlled.    Recent Labs   Lab Test 03/23/21  0832 05/16/17  1002   CHOL 190 203*   HDL 38* 42*   * 128*   TRIG 186* 164*       3.  Aggressive diabetic prevention, screening and/or management.      You do not have diabetes as of the most recent blood tests.     4.  No smoking    5.  Consider daily preventative aspirin over age 50 if you have enough cardiac risk factors to place you at higher risk for the presence of vascular disease.    If you have any reason not to take aspirin such easy bruising or bleeding, stomach problems, other anticoagulant medications, or any other side effects, then you should not take Aspirin.     --Based on your current cardiac disease risk profile and/or age over 75, you do NOT need to take daily preventative aspirin.        Preventive " "Health Recommendations  Female Ages 50 - 74    Yearly exam: See your health care provider every year in order to  Review health changes.   Discuss preventive care.    Review your medicines if your doctor has prescribed any.    Get a Pap test every three years (unless you have an abnormal result and your provider advises testing more often).  If you get Pap tests with HPV test, you only need to test every 5 years, unless you have an abnormal result.   You do not need a Pap test if your uterus was removed (hysterectomy) and you have not had cancer.  You should be tested each year for STDs (sexually transmitted diseases) if you're at risk.   Have a mammogram every 1 to 2 years.  Have a colonoscopy at age 50, or have a yearly FIT test (stool test). These exams screen for colon cancer.    Have a cholesterol test every 5 years, or more often if advised.  Have a diabetes test (fasting glucose) every three years. If you are at risk for diabetes, you should have this test more often.   If you are at risk for osteoporosis (brittle bone disease), think about having a bone density scan (DEXA).    Shots: Get a flu shot each year. Get a tetanus shot every 10 years.    Nutrition:   Eat at least 5 servings of fruits and vegetables each day.  Eat whole-grain bread, whole-wheat pasta and brown rice instead of white grains and rice.  Talk to your provider about Calcium and Vitamin D.        --Good Grains:  Oats, brown rice, Quinoa (these do not raise the blood sugar as much)     --Bad grains:  Anything made from wheat or white rice     (because these raise the blood sugars significantly, and the possible gluten issue from wheat for some people).      --Proteins:  Aim for \"lean proteins\" including chicken, fish, seafood, pork, turkey, and eggs (in moderation); Eat red meat only occasionally    Lifestyle  Exercise at least 150 minutes a week (30 minutes a day, 5 days a week). This will help you control your weight and prevent " disease.  Limit alcohol to one drink per day.  No smoking.   Wear sunscreen to prevent skin cancer.   See your dentist every six months for an exam and cleaning.  See your eye doctor every 1 to 2 years.          Patient Education   Personalized Prevention Plan  You are due for the preventive services outlined below.  Your care team is available to assist you in scheduling these services.  If you have already completed any of these items, please share that information with your care team to update in your medical record.  Health Maintenance Due   Topic Date Due    Osteoporosis Screening  Never done    ANNUAL REVIEW OF HM ORDERS  Never done    Mammogram  Never done    HIV Screening  Never done    Zoster (Shingles) Vaccine (1 of 2) Never done    Colorectal Cancer Screening  07/14/2013    Pneumococcal Vaccine (1 - PCV) Never done    COVID-19 Vaccine (3 - Booster for Pfizer series) 09/20/2021     Your Health Risk Assessment indicates you feel you are not in good health    A healthy lifestyle helps keep the body fit and the mind alert. It helps protect you from disease, helps you fight disease, and helps prevent chronic disease (disease that doesn't go away) from getting worse. This is important as you get older and begin to notice twinges in muscles and joints and a decline in the strength and stamina you once took for granted. A healthy lifestyle includes good healthcare, good nutrition, weight control, recreation, and regular exercise. Avoid harmful substances and do what you can to keep safe. Another part of a healthy lifestyle is stay mentally active and socially involved.    Good healthcare   Have a wellness visit every year.   If you have new symptoms, let us know right away. Don't wait until the next checkup.   Take medicines exactly as prescribed and keep your medicines in a safe place. Tell us if your medicine causes problems.   Healthy diet and weight control   Eat 3 or 4 small, nutritious, low-fat, high-fiber  meals a day. Include a variety of fruits, vegetables, and whole-grain foods.   Make sure you get enough calcium in your diet. Calcium, vitamin D, and exercise help prevent osteoporosis (bone thinning).   If you live alone, try eating with others when you can. That way you get a good meal and have company while you eat it.   Try to keep a healthy weight. If you eat more calories than your body uses for energy, it will be stored as fat and you will gain weight.     Recreation   Recreation is not limited to sports and team events. It includes any activity that provides relaxation, interest, enjoyment, and exercise. Recreation provides an outlet for physical, mental, and social energy. It can give a sense of worth and achievement. It can help you stay healthy.    Mental Exercise and Social Involvement  Mental and emotional health is as important as physical health. Keep in touch with friends and family. Stay as active as possible. Continue to learn and challenge yourself.   Things you can do to stay mentally active are:  Learn something new, like a foreign language or musical instrument.   Play SCRABBLE or do crossword puzzles. If you cannot find people to play these games with you at home, you can play them with others on your computer through the Internet.   Join a games club--anything from card games to chess or checkers or lawn bowling.   Start a new hobby.   Go back to school.   Volunteer.   Read.   Keep up with world events.    Exercise for a Healthier Heart  You may wonder how you can improve the health of your heart. If you re thinking about exercise, you re on the right track. You don t need to become an athlete. But you do need a certain amount of brisk exercise to help strengthen your heart. If you have been diagnosed with a heart condition, your healthcare provider may advise exercise to help stabilize your condition. To help make exercise a habit, choose safe, fun activities.      Exercise with a friend.  When activity is fun, you're more likely to stick with it.   Before you start  Check with your healthcare provider before starting an exercise program. This is especially important if you have not been active for a while. It's also important if you have a long-term (chronic) health problem such as heart disease, diabetes, or obesity. Or if you are at high risk for having these problems.   Why exercise?  Exercising regularly offers many healthy rewards. It can help you do all of the following:   Improve your blood cholesterol level to help prevent further heart trouble  Lower your blood pressure to help prevent a stroke or heart attack  Control diabetes, or reduce your risk of getting this disease  Improve your heart and lung function  Reach and stay at a healthy weight  Make your muscles stronger so you can stay active  Prevent falls and fractures by slowing the loss of bone mass (osteoporosis)  Manage stress better  Reduce your blood pressure  Improve your sense of self and your body image  Exercise tips    Ease into your routine. Set small goals. Then build on them. If you are not sure what your activity level should be, talk with your healthcare provider first before starting an exercise routine.  Exercise on most days. Aim for a total of 150 minutes (2 hours and 30 minutes) or more of moderate-intensity aerobic activity each week. Or 75 minutes (1 hour and 15 minutes) or more of vigorous-intensity aerobic activity each week. Or try for a combination of both. Moderate activity means that you breathe heavier and your heart rate increases but you can still talk. Think about doing 40 minutes of moderate exercise, 3 to 4 times a week. For best results, activity should last for about 40 minutes to lower blood pressure and cholesterol. It's OK to work up to the 40-minute period over time. Examples of moderate-intensity activity are walking 1 mile in 15 minutes. Or doing 30 to 45 minutes of yard work.  Step up your  daily activity level.  Along with your exercise program, try being more active the whole day. Walk instead of drive. Or park further away so that you take more steps each day. Do more household tasks or yard work. You may not be able to meet the advised mount of physical activity. But doing some moderate- or vigorous-intensity aerobic activity can help reduce your risk for heart disease. Your healthcare provider can help you figure out what is best for you.  Choose 1 or more activities you enjoy.  Walking is one of the easiest things you can do. You can also try swimming, riding a bike, dancing, or taking an exercise class.    When to call your healthcare provider  Call your healthcare provider if you have any of these:   Chest pain or feel dizzy or lightheaded  Burning, tightness, pressure, or heaviness in your chest, neck, shoulders, back, or arms  Abnormal shortness of breath  More joint or muscle pain  A very fast or irregular heartbeat (palpitations)  [employer] last reviewed this educational content on 7/1/2019 2000-2021 [employer]. All rights reserved. This information is not intended as a substitute for professional medical care. Always follow your healthcare professional's instructions.          Urinary Incontinence, Female (Adult)   Urinary incontinence means loss of bladder control. This problem affects many women, especially as they get older. If you have incontinence, you may be embarrassed to ask for help. But know that this problem can be treated.   Types of Incontinence  There are different types of incontinence. Two of the main types are described here. You can have more than one type.   Stress incontinence. With this type, urine leaks when pressure (stress) is put on the bladder. This may happen when you cough, sneeze, or laugh. Stress incontinence most often occurs because the pelvic floor muscles that support the bladder and urethra are weak. This can happen after pregnancy and  vaginal childbirth or a hysterectomy. It can also be due to excess body weight or hormone changes.  Urge incontinence (also called overactive bladder). With this type, a sudden urge to urinate is felt often. This may happen even though there may not be much urine in the bladder. The need to urinate often during the night is common. Urge incontinence most often occurs because of bladder spasms. This may be due to bladder irritation or infection. Damage to bladder nerves or pelvic muscles, constipation, and certain medicines can also lead to urge incontinence.  Treatment depends on the cause. Further evaluation is needed to find the type you have. This will likely include an exam and certain tests. Based on the results, you and your healthcare provider can then plan treatment. Until a diagnosis is made, the home care tips below can help ease symptoms.   Home care  Do pelvic floor muscle exercises, if they are prescribed. The pelvic floor muscles help support the bladder and urethra. Many women find that their symptoms improve when doing special exercises that strengthen these muscles. To do the exercises, contract the muscles you would use to stop your stream of urine. But do this when you re not urinating. Hold for 10 seconds, then relax. Repeat 10 to 20 times in a row, at least 3 times a day. Your healthcare provider may give you other instructions for how to do the exercises and how often.  Keep a bladder diary. This helps track how often and how much you urinate over a set period of time. Bring this diary with you to your next visit with the provider. The information can help your provider learn more about your bladder problem.  Lose weight, if advised to by your provider. Extra weight puts pressure on the bladder. Your provider can help you create a weight-loss plan that s right for you. This may include exercising more and making certain diet changes.  Don't have foods and drinks that may irritate the bladder.  These can include alcohol and caffeinated drinks.  Quit smoking. Smoking and other tobacco use can lead to a long-term (chronic) cough that strains the pelvic floor muscles. Smoking may also damage the bladder and urethra. Talk with your provider about treatments or methods you can use to quit smoking.  If drinking large amounts of fluid makes you have symptoms, you may be advised to limit your fluid intake. You may also be advised to drink most of your fluids during the day and to limit fluids at night.  If you re worried about urine leakage or accidents, you may wear absorbent pads to catch urine. Change the pads often. This helps reduce discomfort. It may also reduce the risk of skin or bladder infections.    Follow-up care  Follow up with your healthcare provider, or as directed. It may take some to find the right treatment for your problem. But healthy lifestyle changes can be made right away. These include such things as exercising on a regular basis, eating a healthy diet, losing weight (if needed), and quitting smoking. Your treatment plan may include special therapies or medicines. Certain procedures or surgery may also be options. Talk about any questions you have with your provider.   When to seek medical advice  Call the healthcare provider right away if any of these occur:  Fever of 100.4 F (38 C) or higher, or as directed by your provider  Bladder pain or fullness  Belly swelling  Nausea or vomiting  Back pain  Weakness, dizziness, or fainting  [employer] last reviewed this educational content on 1/1/2020 2000-2021 [employer]. All rights reserved. This information is not intended as a substitute for professional medical care. Always follow your healthcare professional's instructions.        Your Health Risk Assessment indicates you feel you are not in good emotional health.    Recreation   Recreation is not limited to sports and team events. It includes any activity that provides  relaxation, interest, enjoyment, and exercise. Recreation provides an outlet for physical, mental, and social energy. It can give a sense of worth and achievement. It can help you stay healthy.    Mental Exercise and Social Involvement  Mental and emotional health is as important as physical health. Keep in touch with friends and family. Stay as active as possible. Continue to learn and challenge yourself.   Things you can do to stay mentally active are:  Learn something new, like a foreign language or musical instrument.   Play SCRABBLE or do crossword puzzles. If you cannot find people to play these games with you at home, you can play them with others on your computer through the Internet.   Join a games club--anything from card games to chess or checkers or lawn bowling.   Start a new hobby.   Go back to school.   Volunteer.   Read.   Keep up with world events.

## 2022-08-23 NOTE — LETTER
"August 23, 2022      Mulu Knowles  [id number] [place] [address]        Dear ,    We are writing to inform you of your test results.    Your labs all look OK, similar and stable to prior levels.  You still have just \"pre-diabetes\" which means the glucose levels are elevated enough to be abnormal, but not enough to require anything more than diet changes.  Continue to reduce the intake of \"simple carbohydrates\" (e.g. White bread, white rice, pasta, noodles, potatoes, snack foods, regular soda, juices (except fresh squeezed), cakes, cookies, candy, etc.) as best possible.    Continue all other medications at the same doses.  Contact your usual pharmacy if you need refills.     Resulted Orders   Basic metabolic panel   Result Value Ref Range    Sodium 139 133 - 144 mmol/L    Potassium 3.9 3.4 - 5.3 mmol/L    Chloride 106 94 - 109 mmol/L    Carbon Dioxide (CO2) 27 20 - 32 mmol/L    Anion Gap 6 3 - 14 mmol/L    Urea Nitrogen 25 7 - 30 mg/dL    Creatinine 0.84 0.52 - 1.04 mg/dL    Calcium 9.4 8.5 - 10.1 mg/dL    Glucose 112 (H) 70 - 99 mg/dL    GFR Estimate 77 >60 mL/min/1.73m2      Comment:      Effective December 21, 2021 eGFRcr in adults is calculated using the 2021 CKD-EPI creatinine equation which includes age and gender (Jorge reagan al., NEJM, DOI: 10.1056/IAHGpk0804378)   Hemoglobin A1c   Result Value Ref Range    Hemoglobin A1C 6.4 (H) 0.0 - 5.6 %      Comment:      Normal <5.7%   Prediabetes 5.7-6.4%    Diabetes 6.5% or higher     Note: Adopted from ADA consensus guidelines.   Lipid panel reflex to direct LDL Fasting   Result Value Ref Range    Cholesterol 174 <200 mg/dL    Triglycerides 182 (H) <150 mg/dL    Direct Measure HDL 38 (L) >=50 mg/dL    LDL Cholesterol Calculated 100 <=100 mg/dL    Non HDL Cholesterol 136 (H) <130 mg/dL    Patient Fasting > 8hrs? Yes     Narrative    Cholesterol  Desirable:  <200 mg/dL    Triglycerides  Normal:  Less than 150 mg/dL  Borderline High:  150-199 mg/dL  High:  200-499 " mg/dL  Very High:  Greater than or equal to 500 mg/dL    Direct Measure HDL  Female:  Greater than or equal to 50 mg/dL   Male:  Greater than or equal to 40 mg/dL    LDL Cholesterol  Desirable:  <100mg/dL  Above Desirable:  100-129 mg/dL   Borderline High:  130-159 mg/dL   High:  160-189 mg/dL   Very High:  >= 190 mg/dL    Non HDL Cholesterol  Desirable:  130 mg/dL  Above Desirable:  130-159 mg/dL  Borderline High:  160-189 mg/dL  High:  190-219 mg/dL  Very High:  Greater than or equal to 220 mg/dL   CBC with platelets and differential   Result Value Ref Range    WBC Count 6.3 4.0 - 11.0 10e3/uL    RBC Count 4.57 3.80 - 5.20 10e6/uL    Hemoglobin 13.6 11.7 - 15.7 g/dL    Hematocrit 42.9 35.0 - 47.0 %    MCV 94 78 - 100 fL    MCH 29.8 26.5 - 33.0 pg    MCHC 31.7 31.5 - 36.5 g/dL    RDW 11.7 10.0 - 15.0 %    Platelet Count 190 150 - 450 10e3/uL    % Neutrophils 37 %    % Lymphocytes 50 %    % Monocytes 7 %    % Eosinophils 5 %    % Basophils 1 %    % Immature Granulocytes 0 %    NRBCs per 100 WBC 0 <1 /100    Absolute Neutrophils 2.4 1.6 - 8.3 10e3/uL    Absolute Lymphocytes 3.1 0.8 - 5.3 10e3/uL    Absolute Monocytes 0.4 0.0 - 1.3 10e3/uL    Absolute Eosinophils 0.3 0.0 - 0.7 10e3/uL    Absolute Basophils 0.0 0.0 - 0.2 10e3/uL    Absolute Immature Granulocytes 0.0 <=0.4 10e3/uL    Absolute NRBCs 0.0 10e3/uL       If you have any questions or concerns, please call the clinic at the number listed above.       Sincerely,      Bay Cyr MD

## 2022-08-24 ENCOUNTER — PATIENT OUTREACH (OUTPATIENT)
Dept: GERIATRIC MEDICINE | Facility: CLINIC | Age: 67
End: 2022-08-24

## 2022-08-24 LAB — HIV 1+2 AB+HIV1 P24 AG SERPL QL IA: NONREACTIVE

## 2022-08-24 NOTE — PROGRESS NOTES
No call to member. [facility] regulatory update.     GENEVIEVE Beck  [facility]   [phone] (Cell)  [phone] (Office)  [phone] (fax)  [email or web address]

## 2022-09-13 ENCOUNTER — PATIENT OUTREACH (OUTPATIENT)
Dept: GERIATRIC MEDICINE | Facility: CLINIC | Age: 67
End: 2022-09-13

## 2022-09-13 NOTE — PROGRESS NOTES
[facility] Care Coordination Contact    Member changed health plan products from [facility] MSC+ to [facility] MSHO effective 9/1/2022. CC to complete items on Product Change/ Health Plan Change check list including MMIS entry. CMS verified [place] and other required tasks.    Candace Zhong  Care Management Specialist  [facility]  [phone]

## 2022-09-19 DIAGNOSIS — I10 BENIGN ESSENTIAL HYPERTENSION: ICD-10-CM

## 2022-09-21 RX ORDER — ATENOLOL AND CHLORTHALIDONE TABLET 50; 25 MG/1; MG/1
TABLET ORAL
Qty: 90 TABLET | Refills: 2 | Status: SHIPPED | OUTPATIENT
Start: 2022-09-21 | End: 2023-05-22

## 2022-09-26 NOTE — PROGRESS NOTES
[facility] Care Coordination Contact      [facility] Health Plan or Product Change    CC received notification that member's health plan or health plan product changed from [facility] MSC+ to are MSHO effective 9/1/2022.  CC contacted member and discussed change and face-to-face visit was offered. Member declined need for home visit. CC reviewed previous Health Risk Assessment/LTCC and POC with member and had not previous assessment- was a refusal.    Transitional HRA completed. No prior care plan to update.  Required referral authorization information communicated to CMS: Not applicable  Writer reviewed the following with member:  ER visits: No  Hospitalizations: No  TCU stays: No  Significant health status changes: no  Falls/Injuries: No  ADL/IADL changes: No  Changes in services: No    Follow-Up Plan: Member informed of future contact, plan to f/u with member with at next regularly scheduled contact.  Contact information shared with member and family, encouraged member to call with any questions or concerns.  GENEVIEVE Beck  [facility]   [phone] (Cell)  [phone] (Office)  [phone] (fax)  [email or web address]

## 2022-12-19 ENCOUNTER — PATIENT OUTREACH (OUTPATIENT)
Dept: GERIATRIC MEDICINE | Facility: CLINIC | Age: 67
End: 2022-12-19

## 2022-12-19 NOTE — PROGRESS NOTES
[facility] Six-Month Telephone Assessment    6 month telephone assessment completed on 12/19/22. Spoke to daughter, Yaneth.  She said her mom is in [place]. Left in October and planning to return in March.  Gave the phone number to [facility] and [employer] so she can report she has been gone over 30 days.    ER visits: No  Hospitalizations: No  TCU stays: No  Significant health status changes: no  Falls/Injuries: No  ADL/IADL changes: No  Changes in services: No    Caregiver Assessment follow up:  n/a    Goals: See POC in chart for goal progress documentation.  No formal assessment, no formal goals.    Will see member in 6 months for an annual health risk assessment.   Encouraged member to call CC with any questions or concerns in the meantime.    GENEVIEVE Beck  [facility]   [phone] (Cell)  [phone] (Office)  [phone] (fax)  [email or web address]

## 2023-04-29 ENCOUNTER — PATIENT OUTREACH (OUTPATIENT)
Dept: GERIATRIC MEDICINE | Facility: CLINIC | Age: 68
End: 2023-04-29
Payer: COMMERCIAL

## 2023-04-30 NOTE — PROGRESS NOTES
Encounter opened due to Regulatory Compass Cate Update to open FVP Program.    Emmett Mac  Care Management Specialist  [facility]  [phone]

## 2023-04-30 NOTE — PROGRESS NOTES
Encounter opened due to Regulatory Compass Cate Update to close FVP Program.    Emmett Mac  Care Management Specialist  [facility]  [phone]

## 2023-05-19 ENCOUNTER — TRANSFERRED RECORDS (OUTPATIENT)
Dept: HEALTH INFORMATION MANAGEMENT | Facility: CLINIC | Age: 68
End: 2023-05-19
Payer: MEDICARE

## 2023-05-19 LAB
HBA1C MFR BLD: 6.6 %
TSH SERPL-ACNC: <0.02 UIU/ML (ref 0.47–4.68)

## 2023-05-22 ENCOUNTER — OFFICE VISIT (OUTPATIENT)
Dept: INTERNAL MEDICINE | Facility: CLINIC | Age: 68
End: 2023-05-22
Payer: COMMERCIAL

## 2023-05-22 ENCOUNTER — LAB (OUTPATIENT)
Dept: LAB | Facility: CLINIC | Age: 68
End: 2023-05-22
Payer: COMMERCIAL

## 2023-05-22 ENCOUNTER — NURSE TRIAGE (OUTPATIENT)
Dept: INTERNAL MEDICINE | Facility: CLINIC | Age: 68
End: 2023-05-22

## 2023-05-22 VITALS
WEIGHT: 150.3 LBS | BODY MASS INDEX: 26.63 KG/M2 | TEMPERATURE: 97.2 F | SYSTOLIC BLOOD PRESSURE: 122 MMHG | HEART RATE: 75 BPM | HEIGHT: 63 IN | OXYGEN SATURATION: 99 % | DIASTOLIC BLOOD PRESSURE: 77 MMHG

## 2023-05-22 DIAGNOSIS — E06.3 HYPOTHYROIDISM DUE TO HASHIMOTO'S THYROIDITIS: ICD-10-CM

## 2023-05-22 DIAGNOSIS — R73.03 PREDIABETES: ICD-10-CM

## 2023-05-22 DIAGNOSIS — R63.4 UNINTENDED WEIGHT LOSS: Primary | ICD-10-CM

## 2023-05-22 DIAGNOSIS — R63.4 UNINTENTIONAL WEIGHT LOSS: Primary | ICD-10-CM

## 2023-05-22 DIAGNOSIS — Z12.11 SCREEN FOR COLON CANCER: ICD-10-CM

## 2023-05-22 DIAGNOSIS — I10 BENIGN ESSENTIAL HYPERTENSION: ICD-10-CM

## 2023-05-22 DIAGNOSIS — R63.4 UNINTENDED WEIGHT LOSS: ICD-10-CM

## 2023-05-22 LAB
ALBUMIN SERPL BCG-MCNC: 3.9 G/DL (ref 3.5–5.2)
ALP SERPL-CCNC: 74 U/L (ref 35–104)
ALT SERPL W P-5'-P-CCNC: 76 U/L (ref 10–35)
ANION GAP SERPL CALCULATED.3IONS-SCNC: 13 MMOL/L (ref 7–15)
AST SERPL W P-5'-P-CCNC: 86 U/L (ref 10–35)
BILIRUB SERPL-MCNC: 1.1 MG/DL
BUN SERPL-MCNC: 19.9 MG/DL (ref 8–23)
CALCIUM SERPL-MCNC: 9.5 MG/DL (ref 8.8–10.2)
CHLORIDE SERPL-SCNC: 98 MMOL/L (ref 98–107)
CREAT SERPL-MCNC: 0.76 MG/DL (ref 0.51–0.95)
DEPRECATED HCO3 PLAS-SCNC: 28 MMOL/L (ref 22–29)
ERYTHROCYTE [DISTWIDTH] IN BLOOD BY AUTOMATED COUNT: 11.8 % (ref 10–15)
GFR SERPL CREATININE-BSD FRML MDRD: 86 ML/MIN/1.73M2
GLUCOSE SERPL-MCNC: 145 MG/DL (ref 70–99)
HBA1C MFR BLD: 6.7 % (ref 0–5.6)
HCT VFR BLD AUTO: 42.4 % (ref 35–47)
HGB BLD-MCNC: 13.5 G/DL (ref 11.7–15.7)
MCH RBC QN AUTO: 28.9 PG (ref 26.5–33)
MCHC RBC AUTO-ENTMCNC: 31.8 G/DL (ref 31.5–36.5)
MCV RBC AUTO: 91 FL (ref 78–100)
PLATELET # BLD AUTO: 190 10E3/UL (ref 150–450)
POTASSIUM SERPL-SCNC: 3.5 MMOL/L (ref 3.4–5.3)
PROT SERPL-MCNC: 8.1 G/DL (ref 6.4–8.3)
RBC # BLD AUTO: 4.67 10E6/UL (ref 3.8–5.2)
SODIUM SERPL-SCNC: 139 MMOL/L (ref 136–145)
T4 FREE SERPL-MCNC: 2.25 NG/DL (ref 0.9–1.7)
TSH SERPL DL<=0.005 MIU/L-ACNC: 0.01 UIU/ML (ref 0.3–4.2)
WBC # BLD AUTO: 7.4 10E3/UL (ref 4–11)

## 2023-05-22 PROCEDURE — 84439 ASSAY OF FREE THYROXINE: CPT

## 2023-05-22 PROCEDURE — 99214 OFFICE O/P EST MOD 30 MIN: CPT | Performed by: INTERNAL MEDICINE

## 2023-05-22 PROCEDURE — 83036 HEMOGLOBIN GLYCOSYLATED A1C: CPT

## 2023-05-22 PROCEDURE — 84443 ASSAY THYROID STIM HORMONE: CPT

## 2023-05-22 PROCEDURE — 80053 COMPREHEN METABOLIC PANEL: CPT

## 2023-05-22 PROCEDURE — 36415 COLL VENOUS BLD VENIPUNCTURE: CPT

## 2023-05-22 PROCEDURE — 85027 COMPLETE CBC AUTOMATED: CPT

## 2023-05-22 RX ORDER — ATENOLOL AND CHLORTHALIDONE TABLET 50; 25 MG/1; MG/1
1 TABLET ORAL EVERY MORNING
Qty: 90 TABLET | Refills: 0 | Status: SHIPPED | OUTPATIENT
Start: 2023-05-22 | End: 2023-08-07

## 2023-05-22 RX ORDER — LEVOTHYROXINE SODIUM 88 UG/1
88 TABLET ORAL DAILY
COMMUNITY
Start: 2023-05-22 | End: 2023-05-26

## 2023-05-22 NOTE — TELEPHONE ENCOUNTER
OK to place into the 430 spot today.    It would be very helpful if they could get labs done sometime today before the appointment so we can check a few things and then talk about them in her appointment.   They live in [place], get a lab appointment this morning at [place], [place], or [facility].  She can get these done at any [place] lab, these will be nonfasting.     If she cannot get these labs drawn, then we can do them when she is here.     Future orders placed.       Close encounter when done.

## 2023-05-22 NOTE — LETTER
June 14, 2023      Mulu Knowles  [address] [place] [place] [address]        Dear ,    We are writing to inform you of your test results.    The ColoGuard stool test was NEGATIVE.  This means that there is no indication for colon cancer at this time.    We should reconsider colon cancer screening in 3 years, either ColoGuard stool test every 3 years, colonoscopy every 5--10 years, or our FIT stool test annually.      Resulted Orders   COLOGUARD(EXACT SCIENCES)   Result Value Ref Range    COLOGUARD-ABSTRACT Negative Negative      Comment:        NEGATIVE TEST RESULT. A negative Cologuard result indicates a low likelihood that a colorectal cancer (CRC) or advanced adenoma (adenomatous polyps with more advanced pre-malignant features)  is present. The chance that a person with a negative Cologuard test has a colorectal cancer is less than 1 in 1500 (negative predictive value >99.9%) or has an  advanced adenoma is less than  5.3% (negative predictive value 94.7%). These data are based on a prospective cross-sectional study of 10,000 individuals at average risk for colorectal cancer who were screened with both Cologuard and colonoscopy. (Virginie Barroso al, N Engl J Med 2014;370(14):0779-4221) The normal value (reference range) for this assay is negative.    COLOGUARD RE-SCREENING RECOMMENDATION: Periodic colorectal cancer screening is an important part of preventive healthcare for asymptomatic individuals at average risk for colorectal cancer.  Following a negative Cologuard result, the American Cancer Society and U.S.  Multi-Society Task Force screening guidelines recommend a Cologuard re-screening interval of 3 years.   References: American Cancer Society Guideline for Colorectal Cancer Screening: https://www.cancer.org/cancer/colon-rectal-cancer/llbtugbdm-cqxzollsd-tgujxif/acs-recommendations.html.; Sky BUTTS, John SWARTZ, Kodi GILMORE, Colorectal Cancer Screening: Recommendations for Physicians and Patients from the U.S.  Multi-Society Task Force on Colorectal Cancer Screening , Am J Gastroenterology 2017; 112:1512-6881.    TEST DESCRIPTION: Composite algorithmic analysis of stool DNA-biomarkers with hemoglobin immunoassay.   Quantitative values of individual biomarkers are not reportable and are not associated with individual biomarker result reference ranges. Cologuard is intended for colorectal cancer screening of adults of either sex, 45 years or older, who are at average-risk for colorectal cancer (CRC). Cologuard has been approved for use by the U.S. FDA. The performance of Cologuard was  established in a cross sectional study of average-risk adults aged 50-84. Cologuard performance in patients ages 45 to 49 years was estimated by sub-group analysis of near-age groups. Colonoscopies performed for a positive result may find as the most clinically significant lesion: colorectal cancer [4.0%], advanced adenoma (including sessile serrated polyps greater than or equal to 1cm diameter) [20%] or non- advanced adenoma [31%]; or no colorectal neoplasia [45%]. These estimates are derived from a prospective cross-sectional screening study of 10,000 individuals at average risk for colorectal cancer who were screened with both Cologuard and colonoscopy. (Virginie LUIS. et al, N Engl J Med 2014;370(14):3815-2110.) Cologuard may produce a false negative or false positive result (no colorectal cancer or precancerous polyp present at colonoscopy follow up). A negative Cologuard test result does not guarantee the absence of CRC or advanced adenoma (pre-cancer). The current Cologuard  screening interval is every 3 years. (American Cancer Society and U.S. Multi-Society Task Force). Cologuard performance data in a 10,000 patient pivotal study using colonoscopy as the reference method can be accessed at the following location: www.Timehop.com/results. Additional description of the Cologuard test process, warnings and precautions can be found at  [email or web address].[email or web address].         If you have any questions or concerns, please call the clinic at the number listed above.       Sincerely,      Bay Cyr MD

## 2023-05-22 NOTE — PROGRESS NOTES
"  Assessment & Plan     (R63.4) Unintentional weight loss  (primary encounter diagnosis)  Comment: unclea reason for mild weight loss.   Check labs as ordered, make sure thyroid levels are normal.   Assuming normal thyroid, her mild weight loss could all be due to lower carb diet.   CB normal,   Recheck other labs.   Plan:     (I10) Benign essential hypertension  Comment: This condition is currently controlled on the current medical regimen.  Continue current therapy.   Plan: atenolol-chlorthalidone (TENORETIC) 50-25 MG         tablet            (E03.8,  E06.3) Hypothyroidism due to Hashimoto's thyroiditis  Comment: managed by Endocrinology clinic, has not been contacted byt them about labs from last week's visit.  Adjust dose as needed.   If thyroid labs abnormal, then this could be causing her sym,ptoms.   Plan: levothyroxine (SYNTHROID/LEVOTHROID) 88 MCG         tablet            (R73.03) Prediabetes  Comment: slight elevated in A1C, enough to technically be type II DM.   She promised to make better food choices and recehck in the fall.   Reviewed the labs showing mildly elevated glucose levels consistent with prediabetes.     Discussed \"pre-diabetes\", impaired glucose tolerance, and its part in the dysmetabolic syndrome.   No medications needed at this time.     Discussed the inevitable progression of impaired glucose tolerance toward worsening diabetes mellitus if nothing is changed in the diet, and the need for agressive interventions now to delay and prevent this inevitable progression.    Recommended lower carb diet.  Recommended regular physical activity.   We will continue to monitor this and ramirez additional recommendations and treatments as indicated based on the labs.       Plan:     (Z12.11) Screen for colon cancer  Comment: I discussed current recommendations for routine colon cancer screening starting at age 45 (age 35 for family history of colon cancer).  Recommending colonoscopy as gold standard " "test, but the patient is declining to do colonoscopy at this time.   Discussed ColoGuard stool test for routine colon cancer screening, and will order it if covered by their insurance.   If result Negative, repeat ColoGuard testing every 3 years (or eventually consider colonoscopy)  If result Positive, does not necessarily mean colon cancer, but means they need colonoscopy.    Plan: COLOGUARD(EXACT SCIENCES)                        BMI:   Estimated body mass index is 26.84 kg/m  as calculated from the following:    Height as of this encounter: 1.594 m (5' 2.75\").    Weight as of this encounter: 68.2 kg (150 lb 4.8 oz).           Bay Cyr MD  [facility] Hardwick is a 67 year old, presenting for the following health issues:  Weight Loss      The patient does not speak English as their primary Language.  Today's visit was assisted via an Cambodian video phone interpretor.       Here with  as well.        View : No data to display.              HPI     Concern - Extreme weight loss and fatigue  Onset: 2 months  Description: fatigue, eating less  Intensity: moderate  Progression of Symptoms:  same  Accompanying Signs & Symptoms: fatigue, always tired   Previous history of similar problem: yes  Precipitating factors:        Worsened by: N/A  Alleviating factors:        Improved by: N/A  Therapies tried and outcome:     Reports ongoing weight loss in the past few months.     Wt Readings from Last 10 Encounters:   05/22/23 68.2 kg (150 lb 4.8 oz)   08/23/22 69.9 kg (154 lb)   07/19/22 69.7 kg (153 lb 9.6 oz)   09/21/21 73 kg (161 lb)   03/23/21 72.9 kg (160 lb 12.8 oz)   01/10/19 75.3 kg (166 lb)   05/22/18 70.4 kg (155 lb 1.6 oz)   04/09/18 72.8 kg (160 lb 6.4 oz)   05/16/17 69.9 kg (154 lb 1.6 oz)   09/15/15 67.6 kg (149 lb)       Hypertension:  History of hypertension, on medication.  No reported side effects from medications.    Reviewed last 6 BP " "readings in chart:  BP Readings from Last 6 Encounters:   05/22/23 122/77   08/23/22 120/80   07/19/22 102/84   09/21/21 102/70   03/23/21 110/76   02/22/21 138/80     No active cardiac complaints or symptoms with exercise.       History of hypothyroidism.  On replacement therapy, followed y Endocrinology clinic for this issue.  She has not experienced any significant side effects of this medication.   She reports that she was just seen there last week and had labs drawn, has not heard back about the results yet.     Latest labs reviewed:    Lab Results   Component Value Date    TSH 1.08 03/23/2021    .46 11/03/2020    TSH 1.77 04/09/2018    TSH <0.02 02/18/2014    TSH <0.02 06/01/2012    TSH <0.02 05/14/2012         **I reviewed the information recorded in the patient's EPIC chart (including but not limited to medical history, surgical history, family history, problem list, medication list, and allergy list) and updated the information as indicated based on the patients reported information.           Review of Systems   Constitutional, HEENT, cardiovascular, pulmonary, gi and gu systems are negative, except as otherwise noted.      Objective    /77   Pulse 75   Temp 97.2  F (36.2  C) (Temporal)   Ht 1.594 m (5' 2.75\")   Wt 68.2 kg (150 lb 4.8 oz)   LMP  (LMP Unknown)   SpO2 99%   BMI 26.84 kg/m    Body mass index is 26.84 kg/m .  Physical Exam   GENERAL alert and no distress  EYES:  Normal sclera,conjunctiva, EOMI  HENT: oral and posterior pharynx without lesions or erythema, facies symmetric  NECK: Neck supple. No LAD, without thyroidmegaly.  RESP: Clear to ausculation bilaterally without wheezes or crackles. Normal BS in all fields.  CV: RRR normal S1S2 without murmurs, rubs or gallops.  LYMPH: no cervical lymph adenopathy appreciated  MS: extremities- no gross deformities of the visible extremities noted,   EXT:  no lower extremity edema  PSYCH: Alert and oriented times 3; speech- " coherent  SKIN:  No obvious significant skin lesions on visible portions of face

## 2023-05-22 NOTE — PATIENT INSTRUCTIONS
" You have not lost that much weight compared to the past few years.     Assuming your thyroid labs are normal, Most likely your weight loss is due to diet changes that you have made to help control the pre-diabetes, where you reduced the intake of \"simple carbohydrates\" (e.g. White bread, white rice, pasta, noodles, potatoes, snack foods, regular soda, juices (except fresh squeezed), cakes, cookies, candy, etc.).      No medications are needed for the pre-diabetes at this time.      Continue all medications at the same doses.  Contact your usual pharmacy if you need refills.      The endocrinology clinic will let you know the results from thyroid testing last week and if any changes in the thyroid dose are needed.   The labs from today will help determine this.       Cancer screening is up to date, except for colon cancer screening.         ColoGuard Colon cancer screening:     *  The ColoGuard stool test is good noninvasive way to screen for colon cancer, in average risk individuals.      *  You should NOT use ColoGuard for colon cancer screening if you have a family history of colon cancer or if you have a history of pre-cancerous polyps, you should have a colonoscopy if you have either of these situations.      *  The ColoGuard collection kit will be mailed to your home address by the [employer].  Follow the instructions for collecting and returning the tablet.      *  I will inform you about the results.     *  If the ColoGuard test is Negative, then no colon cancer screening needed for 3 years (would consider either repeating the ColoGuard test or else doing a colonoscopy).     *  If the ColoGuard test is Positive, this does NOT mean that you have colon cacner, it simply means that you will need to have a colonoscopy.  Most of the time, pre-cancerous polyps can turn this test positive.           "

## 2023-05-22 NOTE — TELEPHONE ENCOUNTER
Yessy was called. Appts scheduled.     Appointments in Next Year    May 22, 2023  2:15 PM  [facility] [phone]   May 22, 2023  4:30 PM  (Arrive by 4:10 PM)  Provider Visit with Bay Cyr MD  [facility] ([facility] ) [phone]

## 2023-05-25 ENCOUNTER — TELEPHONE (OUTPATIENT)
Dept: INTERNAL MEDICINE | Facility: CLINIC | Age: 68
End: 2023-05-25
Payer: MEDICARE

## 2023-05-25 DIAGNOSIS — E06.3 HYPOTHYROIDISM DUE TO HASHIMOTO'S THYROIDITIS: ICD-10-CM

## 2023-05-25 DIAGNOSIS — R73.03 PREDIABETES: Primary | ICD-10-CM

## 2023-05-25 NOTE — TELEPHONE ENCOUNTER
Called patient with  and spoke to patient and  they state that they did hear from the endocrinologist and the levothyroxine has been reduced from 88mcg to 75mcg based on her labs and they will be following up with endo    Relayed message from Dr. Cyr no further questions or concerns    Routing as JASMYN Morgan RN

## 2023-05-25 NOTE — TELEPHONE ENCOUNTER
Please contact the patient ( via interpretor).    She saw me a couple of days ago with concerns of unintendede weight loss.   Her labs were all normal, except the thyroi dlevel was too elevated, which is probably causing her symptoms.     She had seen her endocrinologist a couple of days before she saw me, and they reportedly did some labs as well, but had not yet communicated results or plan to patient.   The endo clinic labs almost certainly showed the same thing.     Since she has an endocriinolgist managing her thyroid functino and repalcement, they should be addressing this.      Did she get results from the endo clinic yet?    If so, what did they tell her? (hopefully they adjust her dose of thyroid medication lower and planned for a repeat lab in a month or two)    If not, then she should contact the endo clinic for directions.     If that cannot happen for any reason, then let me know and I will make a recommendation for a lower thyroid supplement dose.     Send the note back to me with the information from the patient.     Thanks.

## 2023-05-25 NOTE — TELEPHONE ENCOUNTER
Received a call from the patient's , Yessy, wanting to update PCP about patient's medications.     Per message below, Levothyroxine dose decreased from 88 mcg to 75 mcg.     Yessy also states the Endocrinologist started the patient on Metformin 500 mg 1 tablet daily due to the A1C.   Lab Results   Component Value Date    A1C 6.7 05/22/2023    A1C 6.4 08/23/2022    A1C 6.2 03/23/2021      would like PCP's advise/recommendations regarding the Metformin. Does PCP agree with starting this medication?     Can we leave a detailed message on this number? YES  Phone number patient can be reached at: Home number on file [phone] (home)    Eli Ware RN  [facility] Triage

## 2023-05-26 ENCOUNTER — TRANSFERRED RECORDS (OUTPATIENT)
Dept: HEALTH INFORMATION MANAGEMENT | Facility: CLINIC | Age: 68
End: 2023-05-26
Payer: MEDICARE

## 2023-05-26 RX ORDER — LEVOTHYROXINE SODIUM 75 UG/1
50 TABLET ORAL DAILY
COMMUNITY
Start: 2023-05-26 | End: 2023-08-22

## 2023-05-26 RX ORDER — METFORMIN HCL 500 MG
500 TABLET, EXTENDED RELEASE 24 HR ORAL
COMMUNITY
Start: 2023-05-26

## 2023-05-26 NOTE — TELEPHONE ENCOUNTER
Called and spoke to the patient's , Yessy (C2C on file). Relayed message from the provider below. Yessy expressed understanding and had no additional questions at this time.     Eli Ware RN

## 2023-05-26 NOTE — TELEPHONE ENCOUNTER
That's what I was hoping.     Take the lower dose of levothyroxine as ordered.     I also agree with the metformin tablet, this will help prevent any progression of prediabetes/diabetes.     (med list updated for both items)    Keep working on the diet.     Follow all instrucitons for lab follow ups as well, usually there will be a repeat lab in a couple of month to make sure the thyroid dose was appropriate.      Continue all other medications at the same doses.  Contact your usual pharmacy if you need refills.     Follow up with me as planned in august.     Close encounter when done.

## 2023-05-31 ENCOUNTER — PATIENT OUTREACH (OUTPATIENT)
Dept: GERIATRIC MEDICINE | Facility: CLINIC | Age: 68
End: 2023-05-31
Payer: MEDICARE

## 2023-05-31 RX ORDER — MELOXICAM 15 MG/1
15 TABLET ORAL DAILY
OUTPATIENT
Start: 2023-05-31

## 2023-05-31 NOTE — PROGRESS NOTES
"[facility] Care Coordination Contact    Per CC, mailed client an \"Unable to Contact\" letter.      Estefanía Gudino  Care Management Specialist  [facility]  [phone]         "

## 2023-05-31 NOTE — TELEPHONE ENCOUNTER
Received refill request for Meloxicam 15mg.   Last refill dated 7/19/22. Lat office visit 7/19/22.   Refill denied as needs an appointment.     YAN Short RN

## 2023-05-31 NOTE — PROGRESS NOTES
[facility] Care Coordination Contact    Completed 4 attempts to reach client with no response.  Member is officially unable to contact effective today.  Completed MMIS entry.  Completed health plan required [facility] POC.    Follow-up Plan: CC will attempt to reach member in six months.    This CC note routed to PCP.    GENEVIEVE Beck  [facility]   [phone] (Office)  [email or web address]

## 2023-05-31 NOTE — LETTER
May 31, 2023      YUNI PERKINS  [address] [place] [address]        Dear Yuni:     I m your care coordinator. I ve been unable to reach you by phone. I am writing to ask you or an authorized representative to call me at [phone]. If you reach my voicemail, please leave a message with your daytime telephone number. . Include a date and time that I can call you. If you are hearing impaired, call the [place] Relay at 732 or [phone] (zdckew-fq-mtsinj relay service).     The reason I am trying to reach you is:    [x] To schedule an assessment  [] For your six (6)-month check-in  [] Other:      Please call me as soon as you receive this letter. I look forward to speaking with you.    Sincerely,    GENEVIEVE Beck    E-mail: [email or web address]  Phone: [phone]      [facility] ([facility] D-SNP) is a health plan that contracts with both Medicare and the [place] Medical Assistance (Medicaid) program to provide benefits of both programs to enrollees. Enrollment in [facility] depends on contract renewal.    [id number] accepted  [id number]                                                                         A (08/2022)

## 2023-06-02 ENCOUNTER — TELEPHONE (OUTPATIENT)
Dept: ORTHOPEDICS | Facility: CLINIC | Age: 68
End: 2023-06-02
Payer: MEDICARE

## 2023-06-02 NOTE — TELEPHONE ENCOUNTER
Please also see refill request dated 5/30/23.   Last office visit 7/19/22.     Phone call to patient with assist of a Cambodian . She was informed of the need for an appointment.   Discussed that Meloxicam is not usually prescribed long term and that a cortisone injection may be recommended. Suggested she discuss at an appointment and will schedule for 40 minutes to allow for an injection if needed.   Appointment scheduled for next available of 7/7/23 at 10:40 with 10:25 am arrival. She verbalized understanding.     YAN Short RN

## 2023-06-02 NOTE — TELEPHONE ENCOUNTER
[facility] Call Center    Phone Message    May a detailed message be left on voicemail: yes     Reason for Call: Other: Mulu called he needs a refill of meloxicam he is out called into [facility] pharmacy [phone]. Please call and let him know when this has been done and/or any questions    Action Taken: Other: [facility]    Travel Screening: Not Applicable

## 2023-06-07 RX ORDER — MELOXICAM 15 MG/1
15 TABLET ORAL DAILY
Qty: 30 TABLET | Refills: 0 | OUTPATIENT
Start: 2023-06-07

## 2023-06-07 NOTE — TELEPHONE ENCOUNTER
Received a faxed refill request from [facility] for Meloxicam 15mg.     Refill denied.     YAN Short RN

## 2023-06-14 LAB — NONINV COLON CA DNA+OCC BLD SCRN STL QL: NEGATIVE

## 2023-06-16 ENCOUNTER — TELEPHONE (OUTPATIENT)
Dept: BONE DENSITY | Facility: CLINIC | Age: 68
End: 2023-06-16
Payer: MEDICARE

## 2023-07-07 ENCOUNTER — OFFICE VISIT (OUTPATIENT)
Dept: ORTHOPEDICS | Facility: CLINIC | Age: 68
End: 2023-07-07
Payer: COMMERCIAL

## 2023-07-07 ENCOUNTER — ANCILLARY PROCEDURE (OUTPATIENT)
Dept: GENERAL RADIOLOGY | Facility: CLINIC | Age: 68
End: 2023-07-07
Attending: FAMILY MEDICINE
Payer: COMMERCIAL

## 2023-07-07 VITALS — HEIGHT: 63 IN | WEIGHT: 150 LBS | BODY MASS INDEX: 26.58 KG/M2

## 2023-07-07 DIAGNOSIS — M25.552 CHRONIC LEFT HIP PAIN: Primary | ICD-10-CM

## 2023-07-07 DIAGNOSIS — M25.552 CHRONIC LEFT HIP PAIN: ICD-10-CM

## 2023-07-07 DIAGNOSIS — G89.29 CHRONIC LEFT HIP PAIN: Primary | ICD-10-CM

## 2023-07-07 DIAGNOSIS — M70.62 TROCHANTERIC BURSITIS OF LEFT HIP: ICD-10-CM

## 2023-07-07 DIAGNOSIS — G89.29 CHRONIC LEFT HIP PAIN: ICD-10-CM

## 2023-07-07 PROCEDURE — 99214 OFFICE O/P EST MOD 30 MIN: CPT | Mod: 25 | Performed by: FAMILY MEDICINE

## 2023-07-07 PROCEDURE — 73502 X-RAY EXAM HIP UNI 2-3 VIEWS: CPT | Mod: TC | Performed by: RADIOLOGY

## 2023-07-07 PROCEDURE — 20611 DRAIN/INJ JOINT/BURSA W/US: CPT | Mod: LT | Performed by: FAMILY MEDICINE

## 2023-07-07 RX ORDER — LIDOCAINE HYDROCHLORIDE 10 MG/ML
1 INJECTION, SOLUTION INFILTRATION; PERINEURAL
Status: SHIPPED | OUTPATIENT
Start: 2023-07-07

## 2023-07-07 RX ORDER — METHYLPREDNISOLONE ACETATE 40 MG/ML
40 INJECTION, SUSPENSION INTRA-ARTICULAR; INTRALESIONAL; INTRAMUSCULAR; SOFT TISSUE
Status: SHIPPED | OUTPATIENT
Start: 2023-07-07

## 2023-07-07 RX ADMIN — METHYLPREDNISOLONE ACETATE 40 MG: 40 INJECTION, SUSPENSION INTRA-ARTICULAR; INTRALESIONAL; INTRAMUSCULAR; SOFT TISSUE at 11:24

## 2023-07-07 RX ADMIN — LIDOCAINE HYDROCHLORIDE 1 ML: 10 INJECTION, SOLUTION INFILTRATION; PERINEURAL at 11:24

## 2023-07-07 NOTE — PROGRESS NOTES
"ASSESSMENT & PLAN  Patient Instructions     1. Chronic left hip pain    2. Trochanteric bursitis of left hip      -Patient has aggravation of chronic left hip pain due to trochanteric bursitis  -X-rays taken office today of the left hip show no acute fracture, dislocation or osseous abnormalities.  -Patient tolerated left trochanteric bursa cortisone injection today without complications.  Patient was given postprocedure instructions.  Patient states that she has had a couple cortisone injections in the past and has had stomach pain and dark stools afterwards.  Patient did not test for blood in her stool.  However, it is unlikely that the cortisone injection because the dark stools since she is not taking the medication orally.  If patient experiences this again, she should go to her primary care doctor or an urgent care center for immediate evaluation.  -Patient will continue with home exercise program  -Patient will call us in a few weeks if pain does not improve for a formal therapy order  -Patient will follow up if pain does not improve  -Call direct clinic number [[phone]] at any time with questions or concerns.    Albert Yeo MD [facility]  [facility]          -----    SUBJECTIVE  Mulu Man is a/an 67 year old female who is seen as a self referral for evaluation of left hip pain. The patient is seen with their  and with an .    Onset: \"long time ago\". Patient describes injury as she fell on the floor landing on her left hip and buttocks.  Location of Pain: left lateral hip  Rating of Pain at worst: 10/10  Rating of Pain Currently: 6-7/10  Worsened by: prolonged walking, pain at night  Better with: Meloxicam  Treatments tried: rest/activity avoidance, ice and heat, Meloxicam PRN, icy hot  Quality: radiating pain from hip to heel  Associated symptoms: no distal numbness or tingling; denies swelling or warmth  Orthopedic history: YES - " "bilateral knee pain - has improved  Relevant surgical history: NO  Social history: social history: retired    Past Medical History:   Diagnosis Date     Allergy      Benign essential hypertension 2018     Hypothyroidism due to Hashimoto's thyroiditis 06/02/2012     Prediabetes 03/23/2021    A1C 6.2     Social History     Socioeconomic History     Marital status:    Tobacco Use     Smoking status: Never     Smokeless tobacco: Never   Vaping Use     Vaping Use: Never used   Substance and Sexual Activity     Alcohol use: No     Drug use: No     Sexual activity: Yes     Partners: Male         Patient's past medical, surgical, social, and family histories were reviewed today and no changes are noted.    REVIEW OF SYSTEMS:  10 point ROS is negative other than symptoms noted above in HPI, Past Medical History or as stated below  Constitutional: NEGATIVE for fever, chills, change in weight  Skin: NEGATIVE for worrisome rashes, moles or lesions  GI/: NEGATIVE for bowel or bladder changes  Neuro: NEGATIVE for weakness, dizziness or paresthesias    OBJECTIVE:  Ht 1.594 m (5' 2.75\")   Wt 68 kg (150 lb)   LMP  (LMP Unknown)   BMI 26.78 kg/m     General: healthy, alert and in no distress  HEENT: no scleral icterus or conjunctival erythema  Skin: no suspicious lesions or rash. No jaundice.  CV: no pedal edema  Resp: normal respiratory effort without conversational dyspnea   Psych: normal mood and affect  Gait: normal steady gait with appropriate coordination and balance  Neuro: Normal light sensory exam of lower extremity  MSK:  LEFT HIP  Inspection:    No obvious deformity or asymmetry, level pelvis  Palpation:    Tender about the anterior groin/joint line, greater trochanteric region and gluteus medius insertion. Otherwise all other landmarks are nontender.  Active Range of Motion:     Flexion within normal limits, IR within normal limits, ER  within normal limits  Strength:    Flexion grossly intact, adduction " grossly intact, abduction grossly intact  Special Tests:    Positive: anterior impingement (FADIR), posterior impingement (EX/AB/ER)    Negative: Logroll, resisted gluteus medius provocation, TORITO    Independent visualization of the below image:    Personal review of left hip x-rays taken office today show no acute fracture, dislocation or osseous abnormalities.    Large Joint Injection/Arthocentesis: L greater trochanteric bursa    Date/Time: 7/7/2023 11:24 AM    Performed by: Yeo, Albert, MD  Authorized by: Yeo, Albert, MD    Indications:  Pain  Needle Size:  22 G  Guidance: ultrasound    Approach:  Lateral  Location:  Hip      Site:  L greater trochanteric bursa  Medications:  40 mg methylPREDNISolone 40 MG/ML; 1 mL lidocaine 1 %  Outcome:  Tolerated well, no immediate complications  Procedure discussed: discussed risks, benefits, and alternatives    Consent Given by:  Patient  Timeout: timeout called immediately prior to procedure    Prep: patient was prepped and draped in usual sterile fashion     Ultrasound was used to ensure safe and accurate needle placement and injection. Ultrasound images of the procedure were permanently stored.              Albert Yeo MD [facility]

## 2023-07-07 NOTE — LETTER
"    7/7/2023         RE: Mulu Knowles  [address] [place] [address]        Dear Colleague,    Thank you for referring your patient, Mulu Knowles, to the [facility] [place]. Please see a copy of my visit note below.    ASSESSMENT & PLAN  Patient Instructions     1. Chronic left hip pain    2. Trochanteric bursitis of left hip      -Patient has aggravation of chronic left hip pain due to trochanteric bursitis  -X-rays taken office today of the left hip show no acute fracture, dislocation or osseous abnormalities.  -Patient tolerated left trochanteric bursa cortisone injection today without complications.  Patient was given postprocedure instructions.  Patient states that she has had a couple cortisone injections in the past and has had stomach pain and dark stools afterwards.  Patient did not test for blood in her stool.  However, it is unlikely that the cortisone injection because the dark stools since she is not taking the medication orally.  If patient experiences this again, she should go to her primary care doctor or an urgent care center for immediate evaluation.  -Patient will continue with home exercise program  -Patient will call us in a few weeks if pain does not improve for a formal therapy order  -Patient will follow up if pain does not improve  -Call direct clinic number [[phone]] at any time with questions or concerns.    Albert Yeo MD [facility] and Sports Medicine  [facility]          -----    SUBJECTIVE  Mulu Knowles is a/an 67 year old female who is seen as a self referral for evaluation of left hip pain. The patient is seen with their  and with an .    Onset: \"long time ago\". Patient describes injury as she fell on the floor landing on her left hip and buttocks.  Location of Pain: left lateral hip  Rating of Pain at worst: 10/10  Rating of Pain Currently: 6-7/10  Worsened by: prolonged walking, pain at " "night  Better with: Meloxicam  Treatments tried: rest/activity avoidance, ice and heat, Meloxicam PRN, icy hot  Quality: radiating pain from hip to heel  Associated symptoms: no distal numbness or tingling; denies swelling or warmth  Orthopedic history: YES - bilateral knee pain - has improved  Relevant surgical history: NO  Social history: social history: retired    Past Medical History:   Diagnosis Date     Allergy      Benign essential hypertension 2018     Hypothyroidism due to Hashimoto's thyroiditis 06/02/2012     Prediabetes 03/23/2021    A1C 6.2     Social History     Socioeconomic History     Marital status:    Tobacco Use     Smoking status: Never     Smokeless tobacco: Never   Vaping Use     Vaping Use: Never used   Substance and Sexual Activity     Alcohol use: No     Drug use: No     Sexual activity: Yes     Partners: Male         Patient's past medical, surgical, social, and family histories were reviewed today and no changes are noted.    REVIEW OF SYSTEMS:  10 point ROS is negative other than symptoms noted above in HPI, Past Medical History or as stated below  Constitutional: NEGATIVE for fever, chills, change in weight  Skin: NEGATIVE for worrisome rashes, moles or lesions  GI/: NEGATIVE for bowel or bladder changes  Neuro: NEGATIVE for weakness, dizziness or paresthesias    OBJECTIVE:  Ht 1.594 m (5' 2.75\")   Wt 68 kg (150 lb)   LMP  (LMP Unknown)   BMI 26.78 kg/m     General: healthy, alert and in no distress  HEENT: no scleral icterus or conjunctival erythema  Skin: no suspicious lesions or rash. No jaundice.  CV: no pedal edema  Resp: normal respiratory effort without conversational dyspnea   Psych: normal mood and affect  Gait: normal steady gait with appropriate coordination and balance  Neuro: Normal light sensory exam of lower extremity  MSK:  LEFT HIP  Inspection:    No obvious deformity or asymmetry, level pelvis  Palpation:    Tender about the anterior groin/joint line, " greater trochanteric region and gluteus medius insertion. Otherwise all other landmarks are nontender.  Active Range of Motion:     Flexion within normal limits, IR within normal limits, ER  within normal limits  Strength:    Flexion grossly intact, adduction grossly intact, abduction grossly intact  Special Tests:    Positive: anterior impingement (FADIR), posterior impingement (EX/AB/ER)    Negative: Logroll, resisted gluteus medius provocation, TORITO    Independent visualization of the below image:    Personal review of left hip x-rays taken office today show no acute fracture, dislocation or osseous abnormalities.    Large Joint Injection/Arthocentesis: L greater trochanteric bursa    Date/Time: 7/7/2023 11:24 AM    Performed by: Yeo, Albert, MD  Authorized by: Yeo, Albert, MD    Indications:  Pain  Needle Size:  22 G  Guidance: ultrasound    Approach:  Lateral  Location:  Hip      Site:  L greater trochanteric bursa  Medications:  40 mg methylPREDNISolone 40 MG/ML; 1 mL lidocaine 1 %  Outcome:  Tolerated well, no immediate complications  Procedure discussed: discussed risks, benefits, and alternatives    Consent Given by:  Patient  Timeout: timeout called immediately prior to procedure    Prep: patient was prepped and draped in usual sterile fashion     Ultrasound was used to ensure safe and accurate needle placement and injection. Ultrasound images of the procedure were permanently stored.              Albert Yeo MD [facility]          Again, thank you for allowing me to participate in the care of your patient.        Sincerely,        Albert Yeo, MD

## 2023-07-07 NOTE — PATIENT INSTRUCTIONS
1. Chronic left hip pain    2. Trochanteric bursitis of left hip      -Patient has aggravation of chronic left hip pain due to trochanteric bursitis  -X-rays taken office today of the left hip show no acute fracture, dislocation or osseous abnormalities.  -Patient tolerated left trochanteric bursa cortisone injection today without complications.  Patient was given postprocedure instructions.  Patient states that she has had a couple cortisone injections in the past and has had stomach pain and dark stools afterwards.  Patient did not test for blood in her stool.  However, it is unlikely that the cortisone injection because the dark stools since she is not taking the medication orally.  If patient experiences this again, she should go to her primary care doctor or an urgent care center for immediate evaluation.  -Patient will continue with home exercise program  -Patient will call us in a few weeks if pain does not improve for a formal therapy order  -Patient will follow up if pain does not improve  -Call direct clinic number [[phone]] at any time with questions or concerns.    Albert Yeo MD [facility] Medicine  [facility] [place]

## 2023-07-22 ENCOUNTER — APPOINTMENT (OUTPATIENT)
Dept: MRI IMAGING | Facility: CLINIC | Age: 68
End: 2023-07-22
Attending: EMERGENCY MEDICINE
Payer: COMMERCIAL

## 2023-07-22 ENCOUNTER — HOSPITAL ENCOUNTER (EMERGENCY)
Facility: CLINIC | Age: 68
Discharge: HOME | End: 2023-07-22
Attending: EMERGENCY MEDICINE | Admitting: EMERGENCY MEDICINE
Payer: COMMERCIAL

## 2023-07-22 VITALS
SYSTOLIC BLOOD PRESSURE: 128 MMHG | DIASTOLIC BLOOD PRESSURE: 78 MMHG | RESPIRATION RATE: 18 BRPM | HEART RATE: 72 BPM | TEMPERATURE: 98.1 F | OXYGEN SATURATION: 100 %

## 2023-07-22 DIAGNOSIS — H93.13 TINNITUS, BILATERAL: ICD-10-CM

## 2023-07-22 DIAGNOSIS — R42 DIZZINESS: ICD-10-CM

## 2023-07-22 LAB
ANION GAP SERPL CALCULATED.3IONS-SCNC: 12 MMOL/L (ref 7–15)
BASOPHILS # BLD AUTO: 0 10E3/UL (ref 0–0.2)
BASOPHILS NFR BLD AUTO: 0 %
BUN SERPL-MCNC: 26.5 MG/DL (ref 8–23)
CALCIUM SERPL-MCNC: 9.7 MG/DL (ref 8.8–10.2)
CHLORIDE SERPL-SCNC: 96 MMOL/L (ref 98–107)
CREAT SERPL-MCNC: 0.97 MG/DL (ref 0.51–0.95)
DEPRECATED HCO3 PLAS-SCNC: 30 MMOL/L (ref 22–29)
EOSINOPHIL # BLD AUTO: 0.5 10E3/UL (ref 0–0.7)
EOSINOPHIL NFR BLD AUTO: 5 %
ERYTHROCYTE [DISTWIDTH] IN BLOOD BY AUTOMATED COUNT: 12.2 % (ref 10–15)
GFR SERPL CREATININE-BSD FRML MDRD: 64 ML/MIN/1.73M2
GLUCOSE SERPL-MCNC: 123 MG/DL (ref 70–99)
HCT VFR BLD AUTO: 40.9 % (ref 35–47)
HGB BLD-MCNC: 13.9 G/DL (ref 11.7–15.7)
IMM GRANULOCYTES # BLD: 0 10E3/UL
IMM GRANULOCYTES NFR BLD: 0 %
LYMPHOCYTES # BLD AUTO: 4.3 10E3/UL (ref 0.8–5.3)
LYMPHOCYTES NFR BLD AUTO: 45 %
MCH RBC QN AUTO: 30.1 PG (ref 26.5–33)
MCHC RBC AUTO-ENTMCNC: 34 G/DL (ref 31.5–36.5)
MCV RBC AUTO: 89 FL (ref 78–100)
MONOCYTES # BLD AUTO: 0.7 10E3/UL (ref 0–1.3)
MONOCYTES NFR BLD AUTO: 7 %
NEUTROPHILS # BLD AUTO: 4.2 10E3/UL (ref 1.6–8.3)
NEUTROPHILS NFR BLD AUTO: 43 %
NRBC # BLD AUTO: 0 10E3/UL
NRBC BLD AUTO-RTO: 0 /100
PLATELET # BLD AUTO: 158 10E3/UL (ref 150–450)
POTASSIUM SERPL-SCNC: 3.9 MMOL/L (ref 3.4–5.3)
RBC # BLD AUTO: 4.62 10E6/UL (ref 3.8–5.2)
SODIUM SERPL-SCNC: 138 MMOL/L (ref 136–145)
WBC # BLD AUTO: 9.7 10E3/UL (ref 4–11)

## 2023-07-22 PROCEDURE — 250N000012 HC RX MED GY IP 250 OP 636 PS 637: Performed by: EMERGENCY MEDICINE

## 2023-07-22 PROCEDURE — 70553 MRI BRAIN STEM W/O & W/DYE: CPT

## 2023-07-22 PROCEDURE — 80048 BASIC METABOLIC PNL TOTAL CA: CPT | Performed by: EMERGENCY MEDICINE

## 2023-07-22 PROCEDURE — 99285 EMERGENCY DEPT VISIT HI MDM: CPT | Mod: 25

## 2023-07-22 PROCEDURE — 258N000003 HC RX IP 258 OP 636: Performed by: EMERGENCY MEDICINE

## 2023-07-22 PROCEDURE — 96361 HYDRATE IV INFUSION ADD-ON: CPT

## 2023-07-22 PROCEDURE — 70549 MR ANGIOGRAPH NECK W/O&W/DYE: CPT

## 2023-07-22 PROCEDURE — A9585 GADOBUTROL INJECTION: HCPCS | Performed by: EMERGENCY MEDICINE

## 2023-07-22 PROCEDURE — 250N000013 HC RX MED GY IP 250 OP 250 PS 637: Performed by: EMERGENCY MEDICINE

## 2023-07-22 PROCEDURE — 96360 HYDRATION IV INFUSION INIT: CPT

## 2023-07-22 PROCEDURE — 70544 MR ANGIOGRAPHY HEAD W/O DYE: CPT

## 2023-07-22 PROCEDURE — 36415 COLL VENOUS BLD VENIPUNCTURE: CPT | Performed by: EMERGENCY MEDICINE

## 2023-07-22 PROCEDURE — 85025 COMPLETE CBC W/AUTO DIFF WBC: CPT | Performed by: EMERGENCY MEDICINE

## 2023-07-22 PROCEDURE — 255N000002 HC RX 255 OP 636: Performed by: EMERGENCY MEDICINE

## 2023-07-22 RX ORDER — PREDNISONE 20 MG/1
60 TABLET ORAL ONCE
Status: COMPLETED | OUTPATIENT
Start: 2023-07-22 | End: 2023-07-22

## 2023-07-22 RX ORDER — MECLIZINE HYDROCHLORIDE 25 MG/1
25 TABLET ORAL ONCE
Status: COMPLETED | OUTPATIENT
Start: 2023-07-22 | End: 2023-07-22

## 2023-07-22 RX ORDER — PREDNISONE 20 MG/1
TABLET ORAL
Qty: 10 TABLET | Refills: 0 | Status: SHIPPED | OUTPATIENT
Start: 2023-07-22 | End: 2023-08-22

## 2023-07-22 RX ORDER — GADOBUTROL 604.72 MG/ML
10 INJECTION INTRAVENOUS ONCE
Status: COMPLETED | OUTPATIENT
Start: 2023-07-22 | End: 2023-07-22

## 2023-07-22 RX ORDER — MECLIZINE HYDROCHLORIDE 25 MG/1
25 TABLET ORAL 3 TIMES DAILY PRN
Qty: 30 TABLET | Refills: 0 | Status: SHIPPED | OUTPATIENT
Start: 2023-07-22 | End: 2023-08-22

## 2023-07-22 RX ADMIN — GADOBUTROL 10 ML: 604.72 INJECTION INTRAVENOUS at 17:39

## 2023-07-22 RX ADMIN — SODIUM CHLORIDE 1000 ML: 9 INJECTION, SOLUTION INTRAVENOUS at 15:33

## 2023-07-22 RX ADMIN — MECLIZINE HYDROCHLORIDE 25 MG: 25 TABLET ORAL at 15:49

## 2023-07-22 RX ADMIN — PREDNISONE 60 MG: 20 TABLET ORAL at 15:49

## 2023-07-22 ASSESSMENT — ACTIVITIES OF DAILY LIVING (ADL)
ADLS_ACUITY_SCORE: 33
ADLS_ACUITY_SCORE: 35
ADLS_ACUITY_SCORE: 35

## 2023-07-22 NOTE — ED PROVIDER NOTES
History     Chief Complaint:  Tinnitus       The history is provided by the patient.      Mulu Knowles is a 67 year old female who presents with bilateral tinnitus. She previously had ongoing left ear pain for the past 20 years, but it resolved recently. She then developed bilateral tinnitus and dizziness 4 days ago, and continues to have them since then. She denies hearing loss, visual disturbance, or weakness/numbness in the extremities.     Independent Historian:   None - Patient Only    Review of External Notes:   None     Medications:    Tenoretic   Levothyroxine   Metformin     Past Medical History:    HTN  Hypothyroidism  Prediabetes     Past Surgical History:    Lumbar microdiskectomy     Physical Exam     Patient Vitals for the past 24 hrs:   BP Temp Pulse Resp SpO2   07/22/23 2015 128/78 -- 72 18 100 %   07/22/23 1411 132/82 98.1  F (36.7  C) 74 18 99 %        Physical Exam  VS: Reviewed per above  HENT: Mucous membranes moist, no nuchal rigidity  EYES: sclera anicteric  CV: Rate as noted, regular rhythm.   RESP: Effort normal. Breath sounds are normal bilaterally.  GI: no tenderness/rebound/guarding, not distended.  NEURO: GCS 15, cranial nerves II through XII are intact, 5 out of 5 strength in all 4 extremities, sensation is intact light touch in all 4 extremities.  Normal finger-nose-finger testing bilaterally, no ataxia.  MSK: No deformity of the extremities  SKIN: Warm and dry    Emergency Department Course   Imaging:  MR Brain w/o & w Contrast  Final Result  IMPRESSION:  HEAD MRI:  1.  No acute ischemia, mass/mass effect, or abnormal intracranial enhancement.  HEAD MRA:  1.  No proximal branch vessel occlusion, flow-limiting stenosis, aneurysm, or vascular malformation.  NECK MRA:  1.  No flow-limiting stenosis or findings of dissection.    MRA Angiogram Neck w/o & w Contrast  Final Result  IMPRESSION:  HEAD MRI:  1.  No acute ischemia, mass/mass effect, or abnormal intracranial enhancement.  HEAD  MRA:  1.  No proximal branch vessel occlusion, flow-limiting stenosis, aneurysm, or vascular malformation.  NECK MRA:  1.  No flow-limiting stenosis or findings of dissection.    MRA Angiogram Head w/o Contrast  Final Result  IMPRESSION:  HEAD MRI:  1.  No acute ischemia, mass/mass effect, or abnormal intracranial enhancement.  HEAD MRA:  1.  No proximal branch vessel occlusion, flow-limiting stenosis, aneurysm, or vascular malformation.  NECK MRA:  1.  No flow-limiting stenosis or findings of dissection.     Report per radiology    Laboratory:  Labs Ordered and Resulted from Time of ED Arrival to Time of ED Departure   BASIC METABOLIC PANEL - Abnormal       Result Value    Sodium 138      Potassium 3.9      Chloride 96 (*)     Carbon Dioxide (CO2) 30 (*)     Anion Gap 12      Urea Nitrogen 26.5 (*)     Creatinine 0.97 (*)     Calcium 9.7      Glucose 123 (*)     GFR Estimate 64     CBC WITH PLATELETS AND DIFFERENTIAL    WBC Count 9.7      RBC Count 4.62      Hemoglobin 13.9      Hematocrit 40.9      MCV 89      MCH 30.1      MCHC 34.0      RDW 12.2      Platelet Count 158      % Neutrophils 43      % Lymphocytes 45      % Monocytes 7      % Eosinophils 5      % Basophils 0      % Immature Granulocytes 0      NRBCs per 100 WBC 0      Absolute Neutrophils 4.2      Absolute Lymphocytes 4.3      Absolute Monocytes 0.7      Absolute Eosinophils 0.5      Absolute Basophils 0.0      Absolute Immature Granulocytes 0.0      Absolute NRBCs 0.0       Emergency Department Course & Assessments:    Interventions:  Medications   meclizine (ANTIVERT) tablet 25 mg (25 mg Oral $Given 7/22/23 1549)   predniSONE (DELTASONE) tablet 60 mg (60 mg Oral $Given 7/22/23 1549)   0.9% sodium chloride BOLUS (0 mLs Intravenous Stopped 7/22/23 1735)   sodium chloride (PF) 0.9% PF flush 60 mL (60 mLs Intravenous $Given 7/22/23 1739)   gadobutrol (GADAVIST) injection 10 mL (10 mLs Intravenous $Given 7/22/23 1739)      Assessments:  1501 I obtained  history and examined the patient as noted above.   I rechecked the patient and explained findings. I discussed plan for discharge home.    Independent Interpretation (X-rays, CTs, rhythm strip):  None    Consultations/Discussion of Management or Tests:  None     Social Determinants of Health affecting care:   Language barrier    Disposition:  The patient was discharged to home.     Impression & Plan    Medical Decision Making:  Patient presents to the ER for evaluation of bilateral tinnitus and dizziness.  Vital signs reassuring.  No focal neurodeficits on exam.  No signs of otitis media or otitis externa.  Despite using professional Cambodian  over the phone, history was difficult to obtain and given dizziness and tinnitus, MRI was obtained to rule out central cause of her symptoms.  MRI of the head and MRA of the head and neck revealed no central cause of patient's symptoms.  Labs are reassuring.  Plan for prednisone burst, meclizine as needed, follow-up with ENT.  Return precautions discussed..     Diagnosis:    ICD-10-CM    1. Tinnitus, bilateral  H93.13       2. Dizziness  R42            Discharge Medications:  Discharge Medication List as of 7/22/2023  8:05 PM      START taking these medications    Details   meclizine (ANTIVERT) 25 MG tablet Take 1 tablet (25 mg) by mouth 3 times daily as needed for dizziness, Disp-30 tablet, R-0, Local Print      predniSONE (DELTASONE) 20 MG tablet Take two tablets (= 40mg) each day for 5 (five) days, Disp-10 tablet, R-0, Local Print                Scribe Disclosure:  I, Mushtaq Cr, am serving as a scribe at 3:40 PM on 7/22/2023 to document services personally performed by Steven Lopez MD based on my observations and the provider's statements to me.   7/22/2023   Steven Lopez MD Lindenbaum, Elan, MD  07/22/23 1379

## 2023-07-22 NOTE — ED TRIAGE NOTES
Patient has had 4 days of ringing in both ears.  Patient states she can hear the ringing all the time.       Triage Assessment     Row Name 07/22/23 1411       Triage Assessment (Adult)    Airway WDL WDL       Respiratory WDL    Respiratory WDL WDL       Skin Circulation/Temperature WDL    Skin Circulation/Temperature WDL WDL       Cardiac WDL    Cardiac WDL WDL       Peripheral/Neurovascular WDL    Peripheral Neurovascular WDL WDL       Cognitive/Neuro/Behavioral WDL    Cognitive/Neuro/Behavioral WDL WDL

## 2023-08-06 DIAGNOSIS — I10 BENIGN ESSENTIAL HYPERTENSION: ICD-10-CM

## 2023-08-07 RX ORDER — ATENOLOL AND CHLORTHALIDONE TABLET 50; 25 MG/1; MG/1
1 TABLET ORAL EVERY MORNING
Qty: 90 TABLET | Refills: 0 | Status: SHIPPED | OUTPATIENT
Start: 2023-08-07 | End: 2023-08-22 | Stop reason: SINTOL

## 2023-08-18 ENCOUNTER — TELEPHONE (OUTPATIENT)
Dept: ORTHOPEDICS | Facility: CLINIC | Age: 68
End: 2023-08-18
Payer: MEDICARE

## 2023-08-18 NOTE — TELEPHONE ENCOUNTER
[facility] Call Center    Phone Message    May a detailed message be left on voicemail: yes     Reason for Call: Pt is looking for a appt asap regarding hip says he can't wait until September, please call regarding this    Action Taken: Other: bu sports med    Travel Screening: Not Applicable

## 2023-08-18 NOTE — TELEPHONE ENCOUNTER
Patient was last seen on 7/7/23. Plan per LOV:  -Patient has aggravation of chronic left hip pain due to trochanteric bursitis  -X-rays taken office today of the left hip show no acute fracture, dislocation or osseous abnormalities.  -Patient tolerated left trochanteric bursa cortisone injection today without complications.  Patient was given postprocedure instructions.  Patient states that she has had a couple cortisone injections in the past and has had stomach pain and dark stools afterwards.  Patient did not test for blood in her stool.  However, it is unlikely that the cortisone injection because the dark stools since she is not taking the medication orally.  If patient experiences this again, she should go to her primary care doctor or an urgent care center for immediate evaluation.  -Patient will continue with home exercise program  -Patient will call us in a few weeks if pain does not improve for a formal therapy order  -Patient will follow up if pain does not improve    Return call to patient using Cambodian . Spoke with patient's  - consent to communicate on file with patient's  who also speaks English.     Patient's  states that the injection did provide good relief but the patient does still note some pain. They are hoping to follow up with a doctor before they leave for an extended trip out of state.    Informed him that unfortunately Dr. Yeo does not have any sooner availability, however, patient could follow up with one of his colleagues. They were agreeable to plan. Appt scheduled with Wil Moore PA-C on 8/31/23. Confirmed appt time and location.    He verbalized understanding and was appreciative of call.    Vibha Dykes MBA, ATC

## 2023-08-22 ENCOUNTER — OFFICE VISIT (OUTPATIENT)
Dept: INTERNAL MEDICINE | Facility: CLINIC | Age: 68
End: 2023-08-22
Payer: COMMERCIAL

## 2023-08-22 VITALS
HEIGHT: 63 IN | OXYGEN SATURATION: 97 % | TEMPERATURE: 98.2 F | HEART RATE: 64 BPM | WEIGHT: 149.2 LBS | BODY MASS INDEX: 26.44 KG/M2 | SYSTOLIC BLOOD PRESSURE: 112 MMHG | DIASTOLIC BLOOD PRESSURE: 72 MMHG

## 2023-08-22 DIAGNOSIS — H93.13 TINNITUS, BILATERAL: ICD-10-CM

## 2023-08-22 DIAGNOSIS — I10 BENIGN ESSENTIAL HYPERTENSION: Primary | ICD-10-CM

## 2023-08-22 DIAGNOSIS — E06.3 HYPOTHYROIDISM DUE TO HASHIMOTO'S THYROIDITIS: ICD-10-CM

## 2023-08-22 DIAGNOSIS — R73.03 PREDIABETES: ICD-10-CM

## 2023-08-22 DIAGNOSIS — Z13.6 CARDIOVASCULAR SCREENING; LDL GOAL LESS THAN 160: ICD-10-CM

## 2023-08-22 DIAGNOSIS — R42 DIZZINESS: ICD-10-CM

## 2023-08-22 PROCEDURE — 99214 OFFICE O/P EST MOD 30 MIN: CPT | Performed by: INTERNAL MEDICINE

## 2023-08-22 RX ORDER — LEVOTHYROXINE SODIUM 50 UG/1
50 TABLET ORAL DAILY
COMMUNITY
Start: 2023-08-22

## 2023-08-22 RX ORDER — ATENOLOL 25 MG/1
25 TABLET ORAL DAILY
Qty: 90 TABLET | Refills: 1 | Status: SHIPPED | OUTPATIENT
Start: 2023-08-22 | End: 2024-03-19

## 2023-08-22 RX ORDER — VITAMIN B COMPLEX
1 TABLET ORAL DAILY
COMMUNITY

## 2023-08-22 ASSESSMENT — PAIN SCALES - GENERAL: PAINLEVEL: MODERATE PAIN (5)

## 2023-08-22 NOTE — PATIENT INSTRUCTIONS
" Blood pressure probably lower than it needs to be.      Stop the Atenolol Chlorthalidone tablet.  Specifically, I need to stop the chlorthalidone portion of this medication.      Start just plain Atenolol 25 mg once per day.      Contact me if you blood pressure is above 140/90 on a regular basis.       Continue all other medications at the same doses.  Contact your usual pharmacy if you need refills.     Continue the lower dose of Levothyroxine as recommended by the Endocrinologist (Thyroid specialist).     Follow up with the Endocrinologist and Complete any blood tests as recommended by the thyroid specialist.       Covid vaccines are now recommended annually.  Get the most updated Covid vaccine when it becomes available, consider getting this at the same time as the annual influenza vaccine.      Investigate a Shingrix shingles vaccine with your pharmacist .  They can tell you the coverage and cost and then give it to you if the price is acceptable.    Medicare sometimes does not cover these Shingrix shingles vaccines, and with Medicare insurance it is usually cheaper to receive this shingles vaccine from a pharmacist in a pharmacy rather than in our clinic.    At this time, you only need the 2 Shingrix vaccines and then you are done.       Return to see me in 6 months, sooner if needed.  Please get fasting labs done at the [facility] or any other [facility] lab 1-2 days before this appointment (schedule a \"lab appointment\").  If you get the labs done at another clinic, make arrangements with them directly.  The orders will be in place.  Eat nothing for at least 8 hours prior to having these labs drawn.  Use [employer] or Call [phone] to schedule the appointment with me and lab appointment.         5 GOALS TO PREVENT VASCULAR DISEASE:     1.  Aggressive blood pressure control, under 130/80 ideally.  Using medications if needed.    Your blood pressure is under good control    BP Readings " "from Last 4 Encounters:   08/22/23 112/72   07/22/23 128/78   05/22/23 122/77   08/23/22 120/80       2.  Aggressive LDL cholesterol (\"bad cholesterol\") lowering as indicated.    Your goal is an LDL under 130 for sure, preferably under 100.  (If you have diabetes or previous vascular disease, the the LDL goals would be under 100 for sure, preferably under 70.)    New guidelines identify four high-risk groups who could benefit from statins:   *people with pre-existing heart disease, such as those who have had a heart attack;   *people ages 40 to 75 who have diabetes of any type  *patients ages 40 to 75 with at least a 7.5% risk of developing cardiovascular disease over the next decade, according to a formula described in the guidelines  *patients with the sort of super-high cholesterol that sometimes runs in families, as evidenced by an LDL of 190 milligrams per deciliter or higher    Your cholesterol levels are well controlled.    Recent Labs   Lab Test 08/23/22  1053 03/23/21  0832   CHOL 174 190   HDL 38* 38*    115*   TRIG 182* 186*       3.  Aggressive diabetic prevention, screening and/or management.       Your pre-diabetes/mild type II diabetes is well controlled with metformin.      4.  No smoking    5.  Consider daily preventative aspirin over age 50 if you have enough cardiac risk factors to place you at higher risk for the presence of vascular disease.    If you have any reason not to take aspirin such easy bruising or bleeding, stomach problems, other anticoagulant medications, or any other side effects, then you should not take Aspirin.     --Based on your current cardiac disease risk profile and/or age over 75, you do NOT need to take daily preventative aspirin.              "

## 2023-08-22 NOTE — PROGRESS NOTES
Assessment & Plan   Problem List Items Addressed This Visit       CARDIOVASCULAR SCREENING; LDL GOAL LESS THAN 160    Relevant Orders    REVIEW OF HEALTH MAINTENANCE PROTOCOL ORDERS (Completed)    CBC with platelets    TSH with free T4 reflex    Lipid panel reflex to direct LDL Fasting    Hemoglobin A1c    Albumin Random Urine Quantitative with Creat Ratio    PRIMARY CARE FOLLOW-UP SCHEDULING    Comprehensive metabolic panel    Benign essential hypertension - Primary    Relevant Medications    atenolol (TENORMIN) 25 MG tablet    Other Relevant Orders    REVIEW OF HEALTH MAINTENANCE PROTOCOL ORDERS (Completed)    CBC with platelets    TSH with free T4 reflex    Lipid panel reflex to direct LDL Fasting    Hemoglobin A1c    Albumin Random Urine Quantitative with Creat Ratio    PRIMARY CARE FOLLOW-UP SCHEDULING    Comprehensive metabolic panel    Hypothyroidism due to Hashimoto's thyroiditis    Relevant Medications    levothyroxine (SYNTHROID/LEVOTHROID) 50 MCG tablet    Other Relevant Orders    REVIEW OF HEALTH MAINTENANCE PROTOCOL ORDERS (Completed)    CBC with platelets    TSH with free T4 reflex    Lipid panel reflex to direct LDL Fasting    Hemoglobin A1c    Albumin Random Urine Quantitative with Creat Ratio    PRIMARY CARE FOLLOW-UP SCHEDULING    Comprehensive metabolic panel    Prediabetes    Relevant Orders    REVIEW OF HEALTH MAINTENANCE PROTOCOL ORDERS (Completed)    CBC with platelets    TSH with free T4 reflex    Lipid panel reflex to direct LDL Fasting    Hemoglobin A1c    Albumin Random Urine Quantitative with Creat Ratio    PRIMARY CARE FOLLOW-UP SCHEDULING    Comprehensive metabolic panel     Other Visit Diagnoses       Tinnitus, bilateral        Dizziness                    Blood pressure probably lower than it needs to be.      Stop the Atenolol Chlorthalidone tablet.  Specifically, I need to stop the chlorthalidone portion of this medication.      Start just plain Atenolol 25 mg once per day.       "Contact me if you blood pressure is above 140/90 on a regular basis.       Continue all other medications at the same doses.  Contact your usual pharmacy if you need refills.     Continue the lower dose of Levothyroxine as recommended by the Endocrinologist (Thyroid specialist).     Follow up with the Endocrinologist and Complete any blood tests as recommended by the thyroid specialist.       Covid vaccines are now recommended annually.  Get the most updated Covid vaccine when it becomes available, consider getting this at the same time as the annual influenza vaccine.      Investigate a Shingrix shingles vaccine with your pharmacist .  They can tell you the coverage and cost and then give it to you if the price is acceptable.    Medicare sometimes does not cover these Shingrix shingles vaccines, and with Medicare insurance it is usually cheaper to receive this shingles vaccine from a pharmacist in a pharmacy rather than in our clinic.    At this time, you only need the 2 Shingrix vaccines and then you are done.       Return to see me in 6 months, sooner if needed.  Please get fasting labs done at the [facility] or any other [facility] lab 1-2 days before this appointment (schedule a \"lab appointment\").  If you get the labs done at another clinic, make arrangements with them directly.  The orders will be in place.  Eat nothing for at least 8 hours prior to having these labs drawn.  Use Self-A-r-T or Call [phone] to schedule the appointment with me and lab appointment.            BMI:   Estimated body mass index is 26.64 kg/m  as calculated from the following:    Height as of this encounter: 1.594 m (5' 2.75\").    Weight as of this encounter: 67.7 kg (149 lb 3.2 oz).           Bay Cyr MD  [facility]    Elke Hardwick is a 67 year old, presenting for the following health issues:  Hypertension, Thyroid Disease (hypothyroid), Hip Pain " "(chronic), and Dizziness        8/22/2023    10:03 AM   Additional Questions   Roomed by Diandra PENA CMA       [facility]     Hypertension Follow-up    Do you check your blood pressure regularly outside of the clinic? Yes sometimes  Are you following a low salt diet? Yes  Are your blood pressures ever more than 140 on the top number (systolic) OR more   than 90 on the bottom number (diastolic), for example 140/90? Yes    Hypothyroidism Follow-up    Since last visit, patient describes the following symptoms: dry skin, constipation, loose stools, tremors, and fatigue  Patient states Levothyroxine dose changed to 50 mcg from 75 mcg about a week ago  Had chest pain and palpitations, weakness and ears ringing when on higher dose    Has been on thyroid replacement for many years.   Was taking higher dose and dose lowered in May.   Also follows with Endocrinology Clinic who recently lowered the dose further   Last note from June reviewed, do not have the last note from last week.     Reviewed labs.     Recently seen in ER for dizziness, tinnitus.   Exam and workup all negatinve ( includigni MRI/MRA),    Given short burst of steroids and sx are all now gone.    No neurological issues at this time.     Reviewed all available labs, documents, and imaging available from [facility]         **I reviewed the information recorded in the patient's EPIC chart (including but not limited to medical history, surgical history, family history, problem list, medication list, and allergy list) and updated the information as indicated based on the patients reported information.           Review of Systems   Constitutional, HEENT, cardiovascular, pulmonary, gi and gu systems are negative, except as otherwise noted.      Objective    /72   Pulse 64   Temp 98.2  F (36.8  C) (Oral)   Ht 1.594 m (5' 2.75\")   Wt 67.7 kg (149 lb 3.2 oz)   LMP  (LMP Unknown)   SpO2 97%   Breastfeeding No   BMI 26.64 kg/m    Body mass index is " 26.64 kg/m .  Physical Exam   GENERAL alert and no distress  EYES:  Normal sclera,conjunctiva, EOMI  HENT: oral and posterior pharynx without lesions or erythema, facies symmetric  NECK: Neck supple. No LAD, without thyroidmegaly.  RESP: Clear to ausculation bilaterally without wheezes or crackles. Normal BS in all fields.  CV: RRR normal S1S2 without murmurs, rubs or gallops.  LYMPH: no cervical lymph adenopathy appreciated  MS: extremities- no gross deformities of the visible extremities noted,   EXT:  no lower extremity edema  PSYCH: Alert and oriented times 3; speech- coherent  SKIN:  No obvious significant skin lesions on visible portions of face

## 2023-08-28 ENCOUNTER — PATIENT OUTREACH (OUTPATIENT)
Dept: GERIATRIC MEDICINE | Facility: CLINIC | Age: 68
End: 2023-08-28
Payer: MEDICARE

## 2023-08-28 NOTE — PROGRESS NOTES
PC to member using phone . Notified her that her MA is inactive. She was not aware. Gave her the phone number for [facility] to call to determine what is needed to make it active again. Her  who speaks English will help her call.     [facility] Refusal Telephone Assessment    Member refused home visit HRA on 8/285/23 (reason: independent). Independent with own cares. Lives with , daughter, and two grandchildren.  Will be traveling to [place] in October.    ER visits: Yes, [facility]  Hospitalizations: No  Health concerns: Diabetes, HTN, Thyroid all discussed at her last OV on 8/22/23 Has BP cuff at home.  Falls/Injuries: No  ADL/IADL Dependencies: None   Member currently receiving the following services: None     Informal support(s): daughter and     Advanced Care Planning discussion, complete code section.    [facility] sponsored benefits: Shared information re: Silver Sneakers/gym memberships, ASA, Calcium +D.    Follow-Up Plan: Member informed of future contact, plan to f/u with member with a 6 month telephone assessment and offer a home visit.  Contact information shared with member and family, encouraged member to call with any questions or concerns at any time.    Requested CMS to mail refusal letter.    GENEVIEVE Beck  [facility]   [phone] (Office)  [email or web address]

## 2023-08-28 NOTE — Clinical Note
Dr. Cyr, I am the [facility] Care Coordinator. Per health plan policy, I am notifying you that she declined a home visit at this time. Reports independent.  GENEVIEVE Beck [facility]  [phone] (Office) [email or web address]

## 2023-08-28 NOTE — LETTER
August 29, 2023    YUNI PERKINS  [address] [place] [address]        Dear Yuni:    As a member of [facility], we offer a health risk assessment at no cost to you. I know you don't want to have the assessment right now. If you change your mind, please call me at the number below.    Who performs the health risk assessment?  A [facility] Care Coordinator performs the assessment. Our Care Coordinators can also help you understand your benefits. They can tell you about services to aid you at home, such as managing your care with your doctors if your health worsens.    Our Care Coordinators are here for you if you need:  Support for activities you used to do by yourself (including making meals, bathing, and paying bills)  Equipment for bathroom or home safety  Help finding a new place to live  Information on staying healthy, preventing falls and immunizations    Questions?  If you have questions, or you would like to do the assessment, call me at [phone]. TTY users call [phone]. I'm here from 8am to 5pm. I may reach out to you again soon.      Sincerely,        GENEVIEVE Beck    E-mail: [email or web address]  Phone: [phone]      [employer]    <[id number] accepted    [id number] (12/2021)  [id number]>

## 2023-08-29 NOTE — PROGRESS NOTES
"[facility] Care Coordination Contact    Per CC, mailed client a \"Refusal of Home Visit\" letter.    Candace Zhong  Care Management Specialist  [facility]  [phone]     "

## 2023-08-31 ENCOUNTER — ANCILLARY PROCEDURE (OUTPATIENT)
Dept: GENERAL RADIOLOGY | Facility: CLINIC | Age: 68
End: 2023-08-31
Attending: PHYSICIAN ASSISTANT
Payer: COMMERCIAL

## 2023-08-31 ENCOUNTER — OFFICE VISIT (OUTPATIENT)
Dept: ORTHOPEDICS | Facility: CLINIC | Age: 68
End: 2023-08-31
Payer: COMMERCIAL

## 2023-08-31 VITALS
HEIGHT: 63 IN | HEART RATE: 62 BPM | WEIGHT: 149.25 LBS | DIASTOLIC BLOOD PRESSURE: 84 MMHG | BODY MASS INDEX: 26.45 KG/M2 | SYSTOLIC BLOOD PRESSURE: 127 MMHG

## 2023-08-31 DIAGNOSIS — M54.16 LUMBAR BACK PAIN WITH RADICULOPATHY AFFECTING LEFT LOWER EXTREMITY: Primary | ICD-10-CM

## 2023-08-31 DIAGNOSIS — M54.16 LUMBAR BACK PAIN WITH RADICULOPATHY AFFECTING LEFT LOWER EXTREMITY: ICD-10-CM

## 2023-08-31 PROCEDURE — 72100 X-RAY EXAM L-S SPINE 2/3 VWS: CPT | Mod: TC | Performed by: RADIOLOGY

## 2023-08-31 PROCEDURE — 99214 OFFICE O/P EST MOD 30 MIN: CPT | Performed by: PHYSICIAN ASSISTANT

## 2023-08-31 RX ORDER — PREDNISONE 10 MG/1
TABLET ORAL
Qty: 21 TABLET | Refills: 0 | Status: SHIPPED | OUTPATIENT
Start: 2023-08-31 | End: 2023-09-20

## 2023-08-31 RX ORDER — GABAPENTIN 300 MG/1
300 CAPSULE ORAL AT BEDTIME
Qty: 30 CAPSULE | Refills: 0 | Status: SHIPPED | OUTPATIENT
Start: 2023-08-31 | End: 2023-10-01

## 2023-08-31 NOTE — PATIENT INSTRUCTIONS
Today we discussed the underlying etiology/pathology of patient's   1. Lumbar back pain with radiculopathy affecting left lower extremity      -We discussed that her exam appears to support left low back pain with lumbar radiculopathy likely following an S1 dermatome pattern.  Her left hip bursa is nontender to palpation today and there is no pain with rotation of her hip.  - We will treat this with oral prednisone taper as well as gabapentin nightly  - Patient leaves to [facility] October 1, 2023 and does not return until March 2024  - If patient is worsening prior to her departure she should let us know of any concerns or problems  - Patient should follow-up with us when she returns if symptoms continue to be noted and repeat evaluation can be done with possible lumbar MRI scan to evaluate for nerve impingement  - She will continue with her home exercise program for hip strengthening program.  -Patient understood that by taking oral prednisone that this will elevate her glucose as she is diabetic.  She states that she only checks her blood sugars on a rare basis.  She will continue with her current diabetic medications as prescribed.    -Call direct clinic number [[phone]] at any time with questions or concerns in regards to your recent office visit with me.     Wil Moore PA-C  [facility]

## 2023-08-31 NOTE — LETTER
8/31/2023         RE: Mulu Knowles  [address] [address]        Dear Colleague,    Thank you for referring your patient, Mulu Knowles, to the [facility] [place]. Please see a copy of my visit note below.    ASSESSMENT & PLAN     Today we discussed the underlying etiology/pathology of patient's   1. Lumbar back pain with radiculopathy affecting left lower extremity      -We discussed that her exam appears to support left low back pain with lumbar radiculopathy likely following an S1 dermatome pattern.  Her left hip bursa is nontender to palpation today and there is no pain with rotation of her hip.  - We will treat this with oral prednisone taper as well as gabapentin nightly  - Patient leaves to [facility] October 1, 2023 and does not return until March 2024  - If patient is worsening prior to her departure she should let us know of any concerns or problems  - Patient should follow-up with us when she returns if symptoms continue to be noted and repeat evaluation can be done with possible lumbar MRI scan to evaluate for nerve impingement  - She will continue with her home exercise program for hip strengthening program.  -Patient understood that by taking oral prednisone that this will elevate her glucose as she is diabetic.  She states that she only checks her blood sugars on a rare basis.  She will continue with her current diabetic medications as prescribed.    -Call direct clinic number [[phone]] at any time with questions or concerns in regards to your recent office visit with me.     Wil Moore PA-C  [facility]        SUBJECTIVE  Mulu Knowles is a/an 67 year old female who is seen for evaluation of left lateral hip pain, low back pain that radiates into the left leg only down to the ankle. The patient is seen with her spouse.  Patient states that when she saw Dr. Yeo in July the symptoms were present but she also was dealing with more  significant left hip pain.  Left hip cortisone bursa injection was done which has eliminated some of her discomfort but she continues to have pain in the left low back buttock region that always radiates down the left leg following a posterior lateral presentation in the thigh and lateral presentation down at the ankle.  She denies any involvement of her toes.  She denies any change in bowel or bladder control or habit, no saddle anesthesia and no motor weakness of the lower extremities.    Saw Dr. Yeo on 7/7/2023  Xrays: 7/7/2023  Injection done at left greater trochanter bursa  Start home exercise program, if not improving see back for a formal physical therapy referral was Dr. Yeo's previous recommendation  When she walks a lot or lays down for a long period of time she feels pain in the left low back and down the leg. Has trouble sleeping.   The injection did give some relief for the hip, but she still has pain in the back and left leg.     No interested in formal PT      Onset: a long time ago after she fell landing on her left hip/buttock Patient describes injury as fall on left hip and buttock a long time ago  Location of Pain: left low back, hip and left leg  Rating of Pain at worst: 8/10  Rating of Pain Currently: 6/10  Worsened by: prolonged walking or laying down  Better with: left hip bursa CSI in July helped some  Treatments tried: Cortisone injection for the left hip bursa was done in July with improvement of that symptom but no improvement of current chief complaint  Quality: aching, burning, radiating  Associated symptoms: numbness and tingling  Orthopedic history: NO  Relevant surgical history: NO  Social history: social history: retired    Past Medical History:   Diagnosis Date     Allergy      Benign essential hypertension 2018     Hypothyroidism due to Hashimoto's thyroiditis 06/02/2012     Prediabetes 03/23/2021    A1C 6.2     Social History     Socioeconomic History     Marital status:     Tobacco Use     Smoking status: Never     Smokeless tobacco: Never   Vaping Use     Vaping Use: Never used   Substance and Sexual Activity     Alcohol use: No     Drug use: No     Sexual activity: Yes     Partners: Male         Patient's past medical, surgical, social, and family histories were personally reviewed today and no changes are noted.    REVIEW OF SYSTEMS:  10 point ROS is negative other than symptoms noted above in HPI, Past Medical History or as stated below  Constitutional: NEGATIVE for fever, chills, change in weight  Skin: NEGATIVE for worrisome rashes, moles or lesions  GI/: NEGATIVE for bowel or bladder changes  Neuro: NEGATIVE for weakness, dizziness or paresthesias    OBJECTIVE:  LMP  (LMP Unknown)    General: healthy, alert and in no distress  HEENT: no scleral icterus or conjunctival erythema  Skin: no suspicious lesions or rash. No jaundice.  CV: no pedal edema  Resp: normal respiratory effort without conversational dyspnea   Psych: normal mood and affect  Gait: normal steady gait with appropriate coordination and balance  Neuro: Normal light sensory exam of lower extremity      MSK:  Exam shows a very pleasant 67-year-old female who is accompanied by her .  Video translation line used today for  purposes.  Patient is able to ambulate full weightbearing without antalgia.  She wears flip-flops for shoe wear.  She can raise up on her toes and heels.  She has full active and passive range of motion of the left hip without pain or discomfort.  There is no pain over the trochanter or the gluteal tissues including at the insertion of the posterior trochanter.  She does have a positive straight leg raise on the left which is negative on the right.  Negative hip impingement testing.  Motor strength is 5 out of 5 including hip flexors, hamstrings, quadriceps, anterior tibialis, extensor hallux longus and gastrocsoleus complex.  No pain to palpation with rocking the pelvis.  Mild  discomfort is noted over the left side paraspinous muscles of the lumbar column with no pain over the spinous processes themselves or the right side low back.  Mild discomfort noted on the left SI joint.  Patient is neurovascularly intact L2-S1 bilaterally.  No lower extremity edema.  Normal tissue perfusion distally.        Independent visualization of the below image:  2 view x-ray of the patient's lumbar spine are obtained and personally reviewed today Showing lumbar disc spaces maintained.  Normal lumbar lordosis.  No evidence of fracture.  Calcifications noted within the abdominal aorta as well as the chondral cartilages.  Hip joint spaces maintained without bone-on-bone contact.  Pelvic ring is intact.  No evidence of the transverse processes of the lumbar column.    Patient's conditions were thoroughly discussed during today's visit with total time spent face-to-face with the patient and documentation being 30 minutes.    Wil Moore PA-C  [facility]      Again, thank you for allowing me to participate in the care of your patient.        Sincerely,        Wil Moore PA-C

## 2023-08-31 NOTE — PROGRESS NOTES
ASSESSMENT & PLAN     Today we discussed the underlying etiology/pathology of patient's   1. Lumbar back pain with radiculopathy affecting left lower extremity      -We discussed that her exam appears to support left low back pain with lumbar radiculopathy likely following an S1 dermatome pattern.  Her left hip bursa is nontender to palpation today and there is no pain with rotation of her hip.  - We will treat this with oral prednisone taper as well as gabapentin nightly  - Patient leaves to [facility] October 1, 2023 and does not return until March 2024  - If patient is worsening prior to her departure she should let us know of any concerns or problems  - Patient should follow-up with us when she returns if symptoms continue to be noted and repeat evaluation can be done with possible lumbar MRI scan to evaluate for nerve impingement  - She will continue with her home exercise program for hip strengthening program.  -Patient understood that by taking oral prednisone that this will elevate her glucose as she is diabetic.  She states that she only checks her blood sugars on a rare basis.  She will continue with her current diabetic medications as prescribed.    -Call direct clinic number [[phone]] at any time with questions or concerns in regards to your recent office visit with me.     Wil Moore PA-C  [facility]        SUBJECTIVE  Mulu Man is a/an 67 year old female who is seen for evaluation of left lateral hip pain, low back pain that radiates into the left leg only down to the ankle. The patient is seen with her spouse.  Patient states that when she saw Dr. Yeo in July the symptoms were present but she also was dealing with more significant left hip pain.  Left hip cortisone bursa injection was done which has eliminated some of her discomfort but she continues to have pain in the left low back buttock region that always radiates down the left leg following a posterior lateral  presentation in the thigh and lateral presentation down at the ankle.  She denies any involvement of her toes.  She denies any change in bowel or bladder control or habit, no saddle anesthesia and no motor weakness of the lower extremities.    Saw Dr. Yeo on 7/7/2023  Xrays: 7/7/2023  Injection done at left greater trochanter bursa  Start home exercise program, if not improving see back for a formal physical therapy referral was Dr. Yeo's previous recommendation  When she walks a lot or lays down for a long period of time she feels pain in the left low back and down the leg. Has trouble sleeping.   The injection did give some relief for the hip, but she still has pain in the back and left leg.     No interested in formal PT      Onset: a long time ago after she fell landing on her left hip/buttock Patient describes injury as fall on left hip and buttock a long time ago  Location of Pain: left low back, hip and left leg  Rating of Pain at worst: 8/10  Rating of Pain Currently: 6/10  Worsened by: prolonged walking or laying down  Better with: left hip bursa CSI in July helped some  Treatments tried: Cortisone injection for the left hip bursa was done in July with improvement of that symptom but no improvement of current chief complaint  Quality: aching, burning, radiating  Associated symptoms: numbness and tingling  Orthopedic history: NO  Relevant surgical history: NO  Social history: social history: retired    Past Medical History:   Diagnosis Date    Allergy     Benign essential hypertension 2018    Hypothyroidism due to Hashimoto's thyroiditis 06/02/2012    Prediabetes 03/23/2021    A1C 6.2     Social History     Socioeconomic History    Marital status:    Tobacco Use    Smoking status: Never    Smokeless tobacco: Never   Vaping Use    Vaping Use: Never used   Substance and Sexual Activity    Alcohol use: No    Drug use: No    Sexual activity: Yes     Partners: Male         Patient's past medical, surgical,  social, and family histories were personally reviewed today and no changes are noted.    REVIEW OF SYSTEMS:  10 point ROS is negative other than symptoms noted above in HPI, Past Medical History or as stated below  Constitutional: NEGATIVE for fever, chills, change in weight  Skin: NEGATIVE for worrisome rashes, moles or lesions  GI/: NEGATIVE for bowel or bladder changes  Neuro: NEGATIVE for weakness, dizziness or paresthesias    OBJECTIVE:  LMP  (LMP Unknown)    General: healthy, alert and in no distress  HEENT: no scleral icterus or conjunctival erythema  Skin: no suspicious lesions or rash. No jaundice.  CV: no pedal edema  Resp: normal respiratory effort without conversational dyspnea   Psych: normal mood and affect  Gait: normal steady gait with appropriate coordination and balance  Neuro: Normal light sensory exam of lower extremity      MSK:  Exam shows a very pleasant 67-year-old female who is accompanied by her .  Video translation line used today for  purposes.  Patient is able to ambulate full weightbearing without antalgia.  She wears flip-flops for shoe wear.  She can raise up on her toes and heels.  She has full active and passive range of motion of the left hip without pain or discomfort.  There is no pain over the trochanter or the gluteal tissues including at the insertion of the posterior trochanter.  She does have a positive straight leg raise on the left which is negative on the right.  Negative hip impingement testing.  Motor strength is 5 out of 5 including hip flexors, hamstrings, quadriceps, anterior tibialis, extensor hallux longus and gastrocsoleus complex.  No pain to palpation with rocking the pelvis.  Mild discomfort is noted over the left side paraspinous muscles of the lumbar column with no pain over the spinous processes themselves or the right side low back.  Mild discomfort noted on the left SI joint.  Patient is neurovascularly intact L2-S1 bilaterally.  No  lower extremity edema.  Normal tissue perfusion distally.        Independent visualization of the below image:  2 view x-ray of the patient's lumbar spine are obtained and personally reviewed today Showing lumbar disc spaces maintained.  Normal lumbar lordosis.  No evidence of fracture.  Calcifications noted within the abdominal aorta as well as the chondral cartilages.  Hip joint spaces maintained without bone-on-bone contact.  Pelvic ring is intact.  No evidence of the transverse processes of the lumbar column.    Patient's conditions were thoroughly discussed during today's visit with total time spent face-to-face with the patient and documentation being 30 minutes.    Wil Moore PA-C  [facility]

## 2023-09-27 ENCOUNTER — PATIENT OUTREACH (OUTPATIENT)
Dept: GERIATRIC MEDICINE | Facility: CLINIC | Age: 68
End: 2023-09-27
Payer: MEDICARE

## 2023-09-27 NOTE — PROGRESS NOTES
PC to Kayden, financial worker at [facility]. She confirmed that member's MA is inactive due to medical ineligibility. Had completed renewal and information was reviewed and processed. Spouse's pension had not been previously reported.  If she stayed on MA she would have a medical spenddown of $437/mo. Would most likely be cheaper to get a supplemental medicare insurance. Still has medicare insurance. Kayden reports that member had left her a message but she was not able to reach her when she tried to call her back. She had until 8/31/23 to completed additional paperwork to stay on MA.     Spoke to Mulu and her  using a phone . Gave them the above information. Not eligible for MA unless she pays a spenddown /mo of $437.  Explained at her latest renewal they calculated her 's pension that had not been counted before. Gave her the phone number for [employer] line and explained she can see her options for a medicare supplement plan. If she chooses to reapply for MA she would have the $437/mo spenddown.    GENEVIEVE Beck  [employer]   [phone] (Office)  [email or web address]

## 2023-11-09 ENCOUNTER — PATIENT OUTREACH (OUTPATIENT)
Dept: GERIATRIC MEDICINE | Facility: CLINIC | Age: 68
End: 2023-11-09
Payer: MEDICARE

## 2023-11-09 NOTE — PROGRESS NOTES
[facility] Care Coordination Contact    No longer active with [facility] case management effective 10/31/23.  Reason for community disenrollment: MA Term due to financial ineligibility.     GENEVIEVE Beck  [facility]   [phone] (Office)  [email or web address]

## 2024-03-14 ENCOUNTER — HOSPITAL ENCOUNTER (EMERGENCY)
Facility: CLINIC | Age: 69
Discharge: HOME | End: 2024-03-15
Attending: STUDENT IN AN ORGANIZED HEALTH CARE EDUCATION/TRAINING PROGRAM | Admitting: STUDENT IN AN ORGANIZED HEALTH CARE EDUCATION/TRAINING PROGRAM
Payer: MEDICARE

## 2024-03-14 VITALS
RESPIRATION RATE: 16 BRPM | HEIGHT: 64 IN | OXYGEN SATURATION: 95 % | HEART RATE: 90 BPM | TEMPERATURE: 100.4 F | BODY MASS INDEX: 25.62 KG/M2 | DIASTOLIC BLOOD PRESSURE: 93 MMHG | SYSTOLIC BLOOD PRESSURE: 143 MMHG

## 2024-03-14 DIAGNOSIS — J10.1 INFLUENZA A: Primary | ICD-10-CM

## 2024-03-14 DIAGNOSIS — R11.2 NAUSEA VOMITING AND DIARRHEA: ICD-10-CM

## 2024-03-14 DIAGNOSIS — R19.7 NAUSEA VOMITING AND DIARRHEA: ICD-10-CM

## 2024-03-14 DIAGNOSIS — R50.9 FEVER IN ADULT: ICD-10-CM

## 2024-03-14 LAB
ALBUMIN SERPL BCG-MCNC: 4.1 G/DL (ref 3.5–5.2)
ALP SERPL-CCNC: 102 U/L (ref 40–150)
ALT SERPL W P-5'-P-CCNC: 36 U/L (ref 0–50)
ANION GAP SERPL CALCULATED.3IONS-SCNC: 13 MMOL/L (ref 7–15)
AST SERPL W P-5'-P-CCNC: ABNORMAL U/L
BASOPHILS # BLD AUTO: 0 10E3/UL (ref 0–0.2)
BASOPHILS NFR BLD AUTO: 0 %
BILIRUB DIRECT SERPL-MCNC: 0.27 MG/DL (ref 0–0.3)
BILIRUB SERPL-MCNC: 1.4 MG/DL
BUN SERPL-MCNC: 16.4 MG/DL (ref 8–23)
CALCIUM SERPL-MCNC: 9.2 MG/DL (ref 8.8–10.2)
CHLORIDE SERPL-SCNC: 97 MMOL/L (ref 98–107)
CREAT SERPL-MCNC: 0.82 MG/DL (ref 0.51–0.95)
DEPRECATED HCO3 PLAS-SCNC: 24 MMOL/L (ref 22–29)
EGFRCR SERPLBLD CKD-EPI 2021: 78 ML/MIN/1.73M2
EOSINOPHIL # BLD AUTO: 0 10E3/UL (ref 0–0.7)
EOSINOPHIL NFR BLD AUTO: 0 %
ERYTHROCYTE [DISTWIDTH] IN BLOOD BY AUTOMATED COUNT: 12.5 % (ref 10–15)
FLUAV RNA SPEC QL NAA+PROBE: POSITIVE
FLUBV RNA RESP QL NAA+PROBE: NEGATIVE
GLUCOSE SERPL-MCNC: 131 MG/DL (ref 70–99)
HCT VFR BLD AUTO: 43.4 % (ref 35–47)
HGB BLD-MCNC: 14.3 G/DL (ref 11.7–15.7)
IMM GRANULOCYTES # BLD: 0 10E3/UL
IMM GRANULOCYTES NFR BLD: 0 %
LIPASE SERPL-CCNC: 71 U/L (ref 13–60)
LYMPHOCYTES # BLD AUTO: 1.3 10E3/UL (ref 0.8–5.3)
LYMPHOCYTES NFR BLD AUTO: 21 %
MCH RBC QN AUTO: 29.6 PG (ref 26.5–33)
MCHC RBC AUTO-ENTMCNC: 32.9 G/DL (ref 31.5–36.5)
MCV RBC AUTO: 90 FL (ref 78–100)
MONOCYTES # BLD AUTO: 0.6 10E3/UL (ref 0–1.3)
MONOCYTES NFR BLD AUTO: 9 %
NEUTROPHILS # BLD AUTO: 4.2 10E3/UL (ref 1.6–8.3)
NEUTROPHILS NFR BLD AUTO: 70 %
NRBC # BLD AUTO: 0 10E3/UL
NRBC BLD AUTO-RTO: 0 /100
PLATELET # BLD AUTO: 146 10E3/UL (ref 150–450)
POTASSIUM SERPL-SCNC: 5.1 MMOL/L (ref 3.4–5.3)
PROT SERPL-MCNC: 8.6 G/DL (ref 6.4–8.3)
RBC # BLD AUTO: 4.83 10E6/UL (ref 3.8–5.2)
RSV RNA SPEC NAA+PROBE: NEGATIVE
SARS-COV-2 RNA RESP QL NAA+PROBE: NEGATIVE
SODIUM SERPL-SCNC: 134 MMOL/L (ref 135–145)
WBC # BLD AUTO: 6.1 10E3/UL (ref 4–11)

## 2024-03-14 PROCEDURE — 258N000003 HC RX IP 258 OP 636: Performed by: STUDENT IN AN ORGANIZED HEALTH CARE EDUCATION/TRAINING PROGRAM

## 2024-03-14 PROCEDURE — 82374 ASSAY BLOOD CARBON DIOXIDE: CPT | Performed by: STUDENT IN AN ORGANIZED HEALTH CARE EDUCATION/TRAINING PROGRAM

## 2024-03-14 PROCEDURE — 87637 SARSCOV2&INF A&B&RSV AMP PRB: CPT | Performed by: STUDENT IN AN ORGANIZED HEALTH CARE EDUCATION/TRAINING PROGRAM

## 2024-03-14 PROCEDURE — 83690 ASSAY OF LIPASE: CPT | Performed by: STUDENT IN AN ORGANIZED HEALTH CARE EDUCATION/TRAINING PROGRAM

## 2024-03-14 PROCEDURE — 96361 HYDRATE IV INFUSION ADD-ON: CPT

## 2024-03-14 PROCEDURE — 36415 COLL VENOUS BLD VENIPUNCTURE: CPT | Performed by: STUDENT IN AN ORGANIZED HEALTH CARE EDUCATION/TRAINING PROGRAM

## 2024-03-14 PROCEDURE — 82248 BILIRUBIN DIRECT: CPT | Performed by: STUDENT IN AN ORGANIZED HEALTH CARE EDUCATION/TRAINING PROGRAM

## 2024-03-14 PROCEDURE — 250N000011 HC RX IP 250 OP 636: Performed by: STUDENT IN AN ORGANIZED HEALTH CARE EDUCATION/TRAINING PROGRAM

## 2024-03-14 PROCEDURE — 80048 BASIC METABOLIC PNL TOTAL CA: CPT | Performed by: STUDENT IN AN ORGANIZED HEALTH CARE EDUCATION/TRAINING PROGRAM

## 2024-03-14 PROCEDURE — 96374 THER/PROPH/DIAG INJ IV PUSH: CPT

## 2024-03-14 PROCEDURE — 99284 EMERGENCY DEPT VISIT MOD MDM: CPT | Mod: 25

## 2024-03-14 PROCEDURE — 85004 AUTOMATED DIFF WBC COUNT: CPT | Performed by: STUDENT IN AN ORGANIZED HEALTH CARE EDUCATION/TRAINING PROGRAM

## 2024-03-14 RX ORDER — ONDANSETRON 2 MG/ML
4 INJECTION INTRAMUSCULAR; INTRAVENOUS EVERY 30 MIN PRN
Status: DISCONTINUED | OUTPATIENT
Start: 2024-03-14 | End: 2024-03-15 | Stop reason: HOSPADM

## 2024-03-14 RX ADMIN — ONDANSETRON 4 MG: 2 INJECTION INTRAMUSCULAR; INTRAVENOUS at 23:37

## 2024-03-14 RX ADMIN — SODIUM CHLORIDE 1000 ML: 9 INJECTION, SOLUTION INTRAVENOUS at 23:15

## 2024-03-14 ASSESSMENT — COLUMBIA-SUICIDE SEVERITY RATING SCALE - C-SSRS
1. IN THE PAST MONTH, HAVE YOU WISHED YOU WERE DEAD OR WISHED YOU COULD GO TO SLEEP AND NOT WAKE UP?: NO
6. HAVE YOU EVER DONE ANYTHING, STARTED TO DO ANYTHING, OR PREPARED TO DO ANYTHING TO END YOUR LIFE?: NO
2. HAVE YOU ACTUALLY HAD ANY THOUGHTS OF KILLING YOURSELF IN THE PAST MONTH?: NO

## 2024-03-14 ASSESSMENT — ACTIVITIES OF DAILY LIVING (ADL): ADLS_ACUITY_SCORE: 33

## 2024-03-15 RX ORDER — ONDANSETRON 4 MG/1
4 TABLET, ORALLY DISINTEGRATING ORAL EVERY 8 HOURS PRN
Qty: 10 TABLET | Refills: 0 | Status: SHIPPED | OUTPATIENT
Start: 2024-03-15 | End: 2024-03-18

## 2024-03-15 NOTE — DISCHARGE INSTRUCTIONS
Thank you for allowing us to evaluate you today.  Follow up with primary care clinician  in 1 week as needed for reevaluation..  For fever, use acetaminophen (Tylenol) and/or ibuprofen.  Drink plenty of fluids.  Please read the guidance provided with your discharge instructions.  Immediately return to the emergency department with any concerns.

## 2024-03-15 NOTE — ED TRIAGE NOTES
Patient brought in by family for 2 days of N/V/D and fever.  ABCs intact, A&OX4.     Triage Assessment (Adult)       Row Name 03/14/24 8428          Triage Assessment    Airway WDL WDL        Respiratory WDL    Respiratory WDL WDL        Skin Circulation/Temperature WDL    Skin Circulation/Temperature WDL WDL        Cardiac WDL    Cardiac WDL WDL        Peripheral/Neurovascular WDL    Peripheral Neurovascular WDL WDL        Cognitive/Neuro/Behavioral WDL    Cognitive/Neuro/Behavioral WDL WDL

## 2024-03-15 NOTE — ED PROVIDER NOTES
"History   Chief Complaint:  Nausea, vomiting, diarrhea    HPI:  Mulu Knowles is a very pleasant 68 year old female with prediabetes, hypertension, hypothyroidism presenting with nausea, vomiting, diarrhea and fever.  Patient has had the symptoms for the last 2 days.  She denies abdominal pain.  No sore throat.  No known sick contacts.  No bloody stool.  No urinary symptoms.    The patient declined use of a professional interpretor and requested use of a family member to act as their Cambodian-language interpretor.     Independent Historian:  Independent history was obtained from the patient's spouse. They provided information detailed above in HPI.     Review of External Notes:  I personally reviewed notes from the patient's clinic visit dated  8/22/2023 . This provided me with information regarding patient's baseline medical problems.     I personally reviewed the patient's chart, including available medication list and available past medical history, past surgical history, family history, and social history.    Physical Exam   Patient Vitals for the past 24 hrs:   BP Temp Temp src Pulse Resp SpO2 Height   03/14/24 2202 (!) 143/93 100.4  F (38  C) Temporal 90 16 95 % 1.626 m (5' 4\")      Physical Exam  Vitals and nursing note reviewed.   Constitutional:       Appearance: She is normal weight. She is ill-appearing.   HENT:      Nose: Nose normal. No congestion or rhinorrhea.      Mouth/Throat:      Mouth: Mucous membranes are dry.   Eyes:      General: No scleral icterus.     Extraocular Movements: Extraocular movements intact.   Cardiovascular:      Rate and Rhythm: Normal rate and regular rhythm.      Heart sounds: Normal heart sounds.   Pulmonary:      Effort: Pulmonary effort is normal.      Breath sounds: Normal breath sounds.   Abdominal:      Palpations: Abdomen is soft.      Tenderness: There is no abdominal tenderness.   Skin:     General: Skin is warm and dry.      Coloration: Skin is not jaundiced or pale. "   Neurological:      Mental Status: She is alert and oriented to person, place, and time.         Emergency Department Course     ECG:   No ECG performed.     Imaging: Laboratory:   No orders to display          Labs Ordered and Resulted from Time of ED Arrival to Time of ED Departure   INFLUENZA A/B, RSV, & SARS-COV2 PCR - Abnormal       Result Value    Influenza A PCR Positive (*)     Influenza B PCR Negative      RSV PCR Negative      SARS CoV2 PCR Negative     BASIC METABOLIC PANEL - Abnormal    Sodium 134 (*)     Potassium 5.1      Chloride 97 (*)     Carbon Dioxide (CO2) 24      Anion Gap 13      Urea Nitrogen 16.4      Creatinine 0.82      GFR Estimate 78      Calcium 9.2      Glucose 131 (*)    HEPATIC FUNCTION PANEL - Abnormal    Protein Total 8.6 (*)     Albumin 4.1      Bilirubin Total 1.4 (*)     Alkaline Phosphatase 102      AST        ALT 36      Bilirubin Direct 0.27     LIPASE - Abnormal    Lipase 71 (*)    CBC WITH PLATELETS AND DIFFERENTIAL - Abnormal    WBC Count 6.1      RBC Count 4.83      Hemoglobin 14.3      Hematocrit 43.4      MCV 90      MCH 29.6      MCHC 32.9      RDW 12.5      Platelet Count 146 (*)     % Neutrophils 70      % Lymphocytes 21      % Monocytes 9      % Eosinophils 0      % Basophils 0      % Immature Granulocytes 0      NRBCs per 100 WBC 0      Absolute Neutrophils 4.2      Absolute Lymphocytes 1.3      Absolute Monocytes 0.6      Absolute Eosinophils 0.0      Absolute Basophils 0.0      Absolute Immature Granulocytes 0.0      Absolute NRBCs 0.0             Procedures  None performed    Interventions & Assessments:           Interventions:  Medications   ondansetron (ZOFRAN) injection 4 mg (4 mg Intravenous $Given 3/14/24 4142)   sodium chloride 0.9% BOLUS 1,000 mL (0 mLs Intravenous Stopped 3/15/24 0003)        Assessments  Independent Interpretations  Consultations/Discussion of Management or Tests       Social Determinants of Health affecting care:   None.       Disposition:  The patient was discharged to home.     Impression & Plan        Medical Decision Making:  This patient has nausea, vomiting, and diarrhea. Reassuring vitals. Abdomen is nontender. Low suspicion for serious pathology such as appendicitis, DKA, pancreatitis, biliary disease, or other surgical process.  Suspected viral process such as influenza or norovirus. Given the lack of bloody stools, fever, unusual ingestions, travel, hospitalization, or recent antibiotic use, this is less likely to be bacterial cause of gastroenteritis or inflammatory bowel disease.    Treatment: IV fluids and ondansetron  Labs: Reassuring, with no leukocytosis, no significant electrolyte abnormality, no significant elevation in lipase or transaminases.  Patient did test positive for influenza A which I think explains her symptoms.  Reevaluation: the patient felt improved. I feel they are appropriate for discharge at this time. Plan for continued symptomatic management at home. Plan for follow-up with primary care clinician  in 1 week for as needed for reevaluation. Findings were discussed.  Additional verbal instructions were provided.  I discussed specific warning signs and instructed the patient to return to the emergency department if there are any concerns. Understanding of instructions was voiced, questions were answered and the patient was discharged.     Diagnosis:    ICD-10-CM    1. Influenza A  J10.1       2. Nausea vomiting and diarrhea  R11.2     R19.7       3. Fever in adult  R50.9            Discharge Medications:  New Prescriptions    ONDANSETRON (ZOFRAN ODT) 4 MG ODT TAB    Take 1 tablet (4 mg) by mouth every 8 hours as needed for nausea        Frank Cole MD  03/15/24 0013

## 2024-03-19 ENCOUNTER — PATIENT OUTREACH (OUTPATIENT)
Dept: INTERNAL MEDICINE | Facility: CLINIC | Age: 69
End: 2024-03-19
Payer: COMMERCIAL

## 2024-03-19 DIAGNOSIS — I10 BENIGN ESSENTIAL HYPERTENSION: ICD-10-CM

## 2024-03-19 NOTE — TELEPHONE ENCOUNTER
What type of discharge? Emergency Department  Risk of Hospital admission or ED visit: 64.3%  Is a TCM episode required? No  When should the patient follow up with PCP? within 30 days of discharge.    Justice L. Phoenix, RN  [facility]

## 2024-03-19 NOTE — TELEPHONE ENCOUNTER
"  ED for acute condition Discharge Protocol    \"Hi, my name is Candace Hallman RN, a registered nurse, and I am calling from [facility].  I am calling to follow up and see how things are going for you after your recent emergency visit.\"    Tell me how you are doing now that you are home?\" I'm a little bit better.       Discharge Instructions    \"Let's review your discharge instructions.  What is/are the follow-up recommendations?  Pt. Response: See PCP in 1 week if not feeling better.     \"Has an appointment with your primary care provider been scheduled?\"  No (not needed)    Medications    \"Tell me what changed about your medicines when you discharged?\"        ONDANSETRON (ZOFRAN ODT) 4 MG ODT TAB    Take 1 tablet (4 mg) by mouth every 8 hours as needed for nausea       \"What questions do you have about your medications?\"   None        Call Summary    \"What questions or concerns do you have about your recent visit and your follow-up care?\"     none    \"If you have questions or things don't continue to improve, we encourage you contact us through the main clinic number (give number).  Even if the clinic is not open, triage nurses are available 24/7 to help you.     We would like you to know that our clinic has extended hours (provide information).  We also have urgent care (provide details on closest location and hours/contact info)\"    \"Thank you for your time and take care!\"          "

## 2024-03-21 RX ORDER — ATENOLOL 25 MG/1
25 TABLET ORAL DAILY
Qty: 90 TABLET | Refills: 1 | Status: SHIPPED | OUTPATIENT
Start: 2024-03-21 | End: 2024-03-29

## 2024-03-22 ENCOUNTER — LAB (OUTPATIENT)
Dept: LAB | Facility: CLINIC | Age: 69
End: 2024-03-22
Payer: MEDICARE

## 2024-03-22 DIAGNOSIS — I10 BENIGN ESSENTIAL HYPERTENSION: ICD-10-CM

## 2024-03-22 DIAGNOSIS — R73.03 PREDIABETES: ICD-10-CM

## 2024-03-22 DIAGNOSIS — E06.3 HYPOTHYROIDISM DUE TO HASHIMOTO'S THYROIDITIS: ICD-10-CM

## 2024-03-22 DIAGNOSIS — Z13.6 CARDIOVASCULAR SCREENING; LDL GOAL LESS THAN 160: ICD-10-CM

## 2024-03-22 LAB
ALBUMIN SERPL BCG-MCNC: 4.1 G/DL (ref 3.5–5.2)
ALP SERPL-CCNC: 77 U/L (ref 40–150)
ALT SERPL W P-5'-P-CCNC: 33 U/L (ref 0–50)
ANION GAP SERPL CALCULATED.3IONS-SCNC: 10 MMOL/L (ref 7–15)
AST SERPL W P-5'-P-CCNC: 46 U/L (ref 0–45)
BILIRUB SERPL-MCNC: 0.8 MG/DL
BUN SERPL-MCNC: 7.6 MG/DL (ref 8–23)
CALCIUM SERPL-MCNC: 9.3 MG/DL (ref 8.8–10.2)
CHLORIDE SERPL-SCNC: 106 MMOL/L (ref 98–107)
CHOLEST SERPL-MCNC: 142 MG/DL
CREAT SERPL-MCNC: 0.79 MG/DL (ref 0.51–0.95)
CREAT UR-MCNC: 324 MG/DL
DEPRECATED HCO3 PLAS-SCNC: 27 MMOL/L (ref 22–29)
EGFRCR SERPLBLD CKD-EPI 2021: 82 ML/MIN/1.73M2
ERYTHROCYTE [DISTWIDTH] IN BLOOD BY AUTOMATED COUNT: 12.2 % (ref 10–15)
FASTING STATUS PATIENT QL REPORTED: YES
GLUCOSE SERPL-MCNC: 104 MG/DL (ref 70–99)
HBA1C MFR BLD: 5.9 % (ref 0–5.6)
HCT VFR BLD AUTO: 41.9 % (ref 35–47)
HDLC SERPL-MCNC: 31 MG/DL
HGB BLD-MCNC: 13.1 G/DL (ref 11.7–15.7)
LDLC SERPL CALC-MCNC: 69 MG/DL
MCH RBC QN AUTO: 28.5 PG (ref 26.5–33)
MCHC RBC AUTO-ENTMCNC: 31.3 G/DL (ref 31.5–36.5)
MCV RBC AUTO: 91 FL (ref 78–100)
MICROALBUMIN UR-MCNC: 77.2 MG/L
MICROALBUMIN/CREAT UR: 23.83 MG/G CR (ref 0–25)
NONHDLC SERPL-MCNC: 111 MG/DL
PLATELET # BLD AUTO: 143 10E3/UL (ref 150–450)
POTASSIUM SERPL-SCNC: 4.4 MMOL/L (ref 3.4–5.3)
PROT SERPL-MCNC: 7.9 G/DL (ref 6.4–8.3)
RBC # BLD AUTO: 4.6 10E6/UL (ref 3.8–5.2)
SODIUM SERPL-SCNC: 143 MMOL/L (ref 135–145)
T4 FREE SERPL-MCNC: 1.36 NG/DL (ref 0.9–1.7)
TRIGL SERPL-MCNC: 212 MG/DL
TSH SERPL DL<=0.005 MIU/L-ACNC: 0.17 UIU/ML (ref 0.3–4.2)
WBC # BLD AUTO: 5.2 10E3/UL (ref 4–11)

## 2024-03-22 PROCEDURE — 80061 LIPID PANEL: CPT

## 2024-03-22 PROCEDURE — 84439 ASSAY OF FREE THYROXINE: CPT

## 2024-03-22 PROCEDURE — 83036 HEMOGLOBIN GLYCOSYLATED A1C: CPT

## 2024-03-22 PROCEDURE — 85027 COMPLETE CBC AUTOMATED: CPT

## 2024-03-22 PROCEDURE — 80053 COMPREHEN METABOLIC PANEL: CPT

## 2024-03-22 PROCEDURE — 84443 ASSAY THYROID STIM HORMONE: CPT

## 2024-03-22 PROCEDURE — 36415 COLL VENOUS BLD VENIPUNCTURE: CPT

## 2024-03-22 PROCEDURE — 82570 ASSAY OF URINE CREATININE: CPT

## 2024-03-22 PROCEDURE — 82043 UR ALBUMIN QUANTITATIVE: CPT

## 2024-03-29 ENCOUNTER — OFFICE VISIT (OUTPATIENT)
Dept: INTERNAL MEDICINE | Facility: CLINIC | Age: 69
End: 2024-03-29
Payer: MEDICARE

## 2024-03-29 VITALS
RESPIRATION RATE: 16 BRPM | HEART RATE: 63 BPM | SYSTOLIC BLOOD PRESSURE: 132 MMHG | OXYGEN SATURATION: 99 % | WEIGHT: 139.4 LBS | DIASTOLIC BLOOD PRESSURE: 80 MMHG | TEMPERATURE: 98.2 F | BODY MASS INDEX: 24.7 KG/M2 | HEIGHT: 63 IN

## 2024-03-29 DIAGNOSIS — Z23 HIGH PRIORITY FOR 2019-NCOV VACCINE: ICD-10-CM

## 2024-03-29 DIAGNOSIS — R73.03 PREDIABETES: ICD-10-CM

## 2024-03-29 DIAGNOSIS — M54.16 LUMBAR BACK PAIN WITH RADICULOPATHY AFFECTING LEFT LOWER EXTREMITY: ICD-10-CM

## 2024-03-29 DIAGNOSIS — E06.3 HYPOTHYROIDISM DUE TO HASHIMOTO'S THYROIDITIS: ICD-10-CM

## 2024-03-29 DIAGNOSIS — I10 BENIGN ESSENTIAL HYPERTENSION: Primary | ICD-10-CM

## 2024-03-29 DIAGNOSIS — Z12.31 VISIT FOR SCREENING MAMMOGRAM: ICD-10-CM

## 2024-03-29 PROCEDURE — 99214 OFFICE O/P EST MOD 30 MIN: CPT | Performed by: INTERNAL MEDICINE

## 2024-03-29 RX ORDER — ATENOLOL 25 MG/1
25 TABLET ORAL DAILY
Qty: 90 TABLET | Refills: 3 | Status: SHIPPED | OUTPATIENT
Start: 2024-03-29

## 2024-03-29 ASSESSMENT — PAIN SCALES - GENERAL: PAINLEVEL: NO PAIN (0)

## 2024-03-29 NOTE — PROGRESS NOTES
Assessment & Plan     (I10) Benign essential hypertension  (primary encounter diagnosis)  Comment: This condition is currently controlled on the current medical regimen.  Continue current therapy.    Plan: atenolol (TENORMIN) 25 MG tablet, REVIEW OF         HEALTH MAINTENANCE PROTOCOL ORDERS            (M54.16) Lumbar back pain with radiculopathy affecting left lower extremity  Comment: This condition is currently controlled on the current medical regimen.  Continue current therapy.   Plan: REVIEW OF HEALTH MAINTENANCE PROTOCOL ORDERS            (E03.8,  E06.3) Hypothyroidism due to Hashimoto's thyroiditis  Comment: recheck labs, titrate meds   Plan: REVIEW OF HEALTH MAINTENANCE PROTOCOL ORDERS            (R73.03) Prediabetes  Comment: This condition is currently controlled on the current medical regimen.  Continue current therapy.   Continue lower cbr diet  Plan: REVIEW OF HEALTH MAINTENANCE PROTOCOL ORDERS            (Z12.31) Visit for screening mammogram  Comment:   Plan: *MA Screening Digital Bilateral, REVIEW OF         HEALTH MAINTENANCE PROTOCOL ORDERS            (Z23) High priority for 2019-nCoV vaccine  Comment:   Plan: REVIEW OF HEALTH MAINTENANCE PROTOCOL ORDERS,         CANCELED: COVID-19 12+ (2023-24) (PFIZER)                             Elke Hardwick is a 68 year old, presenting for the following health issues:  Hypertension, Thyroid Disease, and Imm/Inj (COVID-19 VACCINE)        3/29/2024     9:25 AM   Additional Questions   Roomed by Diandra PENA CMA     HPI       Hypertension Follow-up    Do you check your blood pressure regularly outside of the clinic? Yes sometimes  Are you following a low salt diet? Yes  Are your blood pressures ever more than 140 on the top number (systolic) OR more   than 90 on the bottom number (diastolic), for example 140/90? Yes  sometimes    Hypothyroidism Follow-up    Since last visit, patient describes the following symptoms: weight loss of approx. 30# previous wt.  "162# last year, now 139#, dry skin, loose stools, and hair loss      The patient has a history of impaired glucose tolerance with regularly elevated blood sugars.    They have not been diagnosed with type II DM or placed on medications for diabetes before.   They deny polyuria, polydipsia.     The patient is not obese with a BMI of Body mass index is 24.89 kg/m ..    Review of current labs show:    Lab Results   Component Value Date    A1C 5.9 03/22/2024    A1C 6.7 05/22/2023    A1C 6.4 08/23/2022    A1C 6.2 03/23/2021     @bgl@       Review of Systems  Constitutional, neuro, ENT, endocrine, pulmonary, cardiac, gastrointestinal, genitourinary, musculoskeletal, integument and psychiatric systems are negative, except as otherwise noted.      Objective    /80   Pulse 63   Temp 98.2  F (36.8  C) (Tympanic)   Resp 16   Ht 1.594 m (5' 2.75\")   Wt 63.2 kg (139 lb 6.4 oz)   LMP  (LMP Unknown)   SpO2 99%   Breastfeeding No   BMI 24.89 kg/m    Body mass index is 24.89 kg/m .  Physical Exam   GENERAL alert and no distress  EYES:  Normal sclera,conjunctiva, EOMI  HENT: oral and posterior pharynx without lesions or erythema, facies symmetric  NECK: Neck supple. No LAD, without thyroidmegaly.  RESP: Clear to ausculation bilaterally without wheezes or crackles. Normal BS in all fields.  CV: RRR normal S1S2 without murmurs, rubs or gallops.  LYMPH: no cervical lymph adenopathy appreciated  MS: extremities- no gross deformities of the visible extremities noted,   EXT:  no lower extremity edema  PSYCH: Alert and oriented times 3; speech- coherent  SKIN:  No obvious significant skin lesions on visible portions of face             Signed Electronically by: Bay Cyr MD    "

## 2024-03-29 NOTE — PATIENT INSTRUCTIONS
Continue all medications at the same doses.  Contact your usual pharmacy if you need refills.      Schedule mammogram appointment this summer.      Plan to get the annual influenza vaccine each fall for sure by the end of October for the best protection throughout the winter flu season.   Get this from any pharmacy or flu shot clinic.       Plan to get an updated Covid booster each fall at least by the end of October for the best protection throughout the winter season.   Get this from any pharmacy or Covid vaccine clinic.        Investigate a Shingrix shingles vaccine with your pharmacist .  They can tell you the coverage and cost and then give it to you if the price is acceptable.    Medicare sometimes does not cover these Shingrix shingles vaccines, and with Medicare insurance it is usually cheaper to receive this shingles vaccine from a pharmacist in a pharmacy rather than in our clinic.    At this time, you only need the 2 Shingrix vaccines and then you are done.        Return to see me in 1 year, sooner if needed.  Use [employer] or Call [phone] to schedule the appointment with me.         OSTEOARTHRITIS:  ==========================    wear and tear arthritis    *  If symptoms occur regularly, then try Tylenol extra strength (generic acetominophen OK as well) 2 tablet twice per day, every day.    *  Also can try Glucosamine Chondroitin, any preparation according to the directions on the bottle.  If it does not help or seem to do muhc after 6-8 weeks, then stop it.    *  If you still have symptoms despite tylenol use, then try Motrin, ADvil, or similar over the counter anti-inflammatory.  Start with 1-2 tablet 1-2 times per day working up to higher doses as needed based on your symptoms.  The higher the dose, the higher the possibility of developing side effects.    Always take with food to minimize GI upset and to immediately stop if any side effects.      Potential side effects including GI bleed, GI  irritation, diarrhea, renal dysfunction, bruising/bleeding.    Do not take any OTC antiinflammatory medications if you have had any trobles with ulcers, bleeding or sever bruising problems or are on COumadin or other blood thinners.     *  If you still have arthritis pain despite the use of these medications, then return to see me to discuss other treatments.           Shingles Vaccine (SHINGRIX):      I would recommend that you consider getting the Shingrix shingles vaccine.  The shingles vaccine is recommended for anyone over age 50.     The Shingrix vaccine is a series of 2 vaccines given 2-6 months apart.     The shingles vaccine does not stop you from getting shingles, but it decreases the intensity of the event, the duration of the event, and decreases the painful nerve condition that results     Based on the available data, the Shingrix vaccine has superior benefit and should be considered even if you have had the old Zostavax shinglesvaccine before.      Contact your insurance provider and ask them if either shingles vaccine is covered and is so, how much it will cost you.  Usually this will be cheaper to get in a pharmacy given by the pharmacist.    Regardless of the coverage, I would recommend that you consider the vaccine since shingles is very painful, (just ask anyone who has ever had it)    For Medicare insurance, the vaccine is cheaper to receive from a pharmacist in a pharmacy than for us to give you in the clinic.          5 GOALS IN MANAGING DIABETES (i.e. to give the best chance to prevent diabetic complications and vascular disease):     1.  Aggressive diabetic management.  The target for A1C (3 months average blood sugar) is ideally below 6.5, preferably below 7.5    Your pre-diabetes is under good control.      Lab Results   Component Value Date    A1C 5.9 03/22/2024    A1C 6.7 05/22/2023    A1C 6.4 08/23/2022    A1C 6.2 03/23/2021       2.  Aggressive blood pressure control, under 130/80  "ideally.  Using medications if needed.    Your blood pressure is under good control    BP Readings from Last 4 Encounters:   03/29/24 132/80   03/14/24 (!) 143/93   08/31/23 127/84   08/22/23 112/72       3.  Aggressive LDL cholesterol (bad cholesterol) lowering as needed.  Your goal is an LDL under 100 for sure, preferably under 70.     *  All patients with diabetes are recommended to be on a \"statin\" cholesterol lowering medication regardless of the cholesterol levels to give the best chance at avoiding vascular disease.      New guidelines identify four high-risk groups who could benefit from statins:   *people with pre-existing heart disease, such as those who have had a heart attack;   *people ages 40 to 75 who have diabetes of any type  *patients ages 40 to 75 with at least a 7.5% risk of developing cardiovascular disease over the next decade, according to a formula described in the guidelines  *patients with the sort of super-high cholesterol that sometimes runs in families, as evidenced by an LDL of 190 milligrams per deciliter or higher    *  Your cholesterol levels are well controlled.    Recent Labs   Lab Test 03/22/24  1001 08/23/22  1053   CHOL 142 174   HDL 31* 38*   LDL 69 100   TRIG 212* 182*       4.  No smoking    5.  Consider daily preventative Aspirin once per day, every day over age 50 unless there is a specific reason that you cannot take aspirin.       --  "

## 2025-05-27 DIAGNOSIS — I10 BENIGN ESSENTIAL HYPERTENSION: ICD-10-CM

## 2025-05-27 RX ORDER — ATENOLOL 25 MG/1
25 TABLET ORAL DAILY
Qty: 90 TABLET | Refills: 0 | Status: SHIPPED | OUTPATIENT
Start: 2025-05-27

## 2025-07-14 ENCOUNTER — TELEPHONE (OUTPATIENT)
Dept: INTERNAL MEDICINE | Facility: CLINIC | Age: 70
End: 2025-07-14
Payer: MEDICARE

## 2025-07-14 NOTE — TELEPHONE ENCOUNTER
Reason for call:  Other   Patient called regarding (reason for call): call back  Additional comments: spouse trying to make appointment for follow up you wanted in August   You are booked until 9/30  Call to advise    Phone number to reach patient:  [phone]    Best Time:  any    Can we leave a detailed message on this number?  no    Travel screening: Not Applicable

## 2025-07-14 NOTE — TELEPHONE ENCOUNTER
Per verbal from CS ok to schedule in next available with him, or with any available Provider to address medication and refills    Please call and assist with scheduling    Marya Uribe CMA

## 2025-07-15 NOTE — TELEPHONE ENCOUNTER
MARGARITOTCB to schedule appt. using Dr. Cyr's parameters listed below. Number listed is Daughter Yessy's phone. She may call to schedule the patient as patient speaks [place]ian. Diandra Barba, CMA

## 2025-08-03 DIAGNOSIS — I10 BENIGN ESSENTIAL HYPERTENSION: ICD-10-CM

## 2025-08-04 RX ORDER — ATENOLOL 25 MG/1
25 TABLET ORAL DAILY
Qty: 90 TABLET | Refills: 0 | Status: SHIPPED | OUTPATIENT
Start: 2025-08-04 | End: 2025-08-05

## 2025-08-05 DIAGNOSIS — I10 BENIGN ESSENTIAL HYPERTENSION: ICD-10-CM

## 2025-08-05 RX ORDER — ATENOLOL 25 MG/1
TABLET ORAL
Refills: 0 | OUTPATIENT
Start: 2025-08-05

## 2025-08-05 RX ORDER — ATENOLOL 25 MG/1
25 TABLET ORAL DAILY
Qty: 90 TABLET | Refills: 0 | Status: SHIPPED | OUTPATIENT
Start: 2025-08-05